# Patient Record
Sex: MALE | Race: WHITE | Employment: PART TIME | URBAN - METROPOLITAN AREA
[De-identification: names, ages, dates, MRNs, and addresses within clinical notes are randomized per-mention and may not be internally consistent; named-entity substitution may affect disease eponyms.]

---

## 2019-07-25 ENCOUNTER — HOSPITAL ENCOUNTER (OUTPATIENT)
Dept: NON INVASIVE DIAGNOSTICS | Age: 69
Discharge: HOME | End: 2019-07-25
Payer: MEDICARE

## 2019-07-25 LAB
LV EF: 55 %
LVEF MODALITY: NORMAL

## 2019-07-25 PROCEDURE — 93306 TTE W/DOPPLER COMPLETE: CPT

## 2019-07-30 ENCOUNTER — HOSPITAL ENCOUNTER (OUTPATIENT)
Age: 69
Discharge: HOME | End: 2019-07-30
Payer: MEDICARE

## 2019-07-30 ENCOUNTER — HOSPITAL ENCOUNTER (OUTPATIENT)
Dept: GENERAL RADIOLOGY | Age: 69
Discharge: HOME | End: 2019-07-30
Payer: MEDICARE

## 2019-07-30 DIAGNOSIS — R06.02 BREATH SHORTNESS: ICD-10-CM

## 2019-07-30 PROCEDURE — 71046 X-RAY EXAM CHEST 2 VIEWS: CPT

## 2020-01-08 ENCOUNTER — HOSPITAL ENCOUNTER (OUTPATIENT)
Dept: NUCLEAR MEDICINE | Age: 70
Discharge: HOME | End: 2020-01-08
Payer: MEDICARE

## 2020-01-08 ENCOUNTER — HOSPITAL ENCOUNTER (OUTPATIENT)
Dept: GENERAL RADIOLOGY | Age: 70
Discharge: HOME | End: 2020-01-08
Payer: MEDICARE

## 2020-01-08 ENCOUNTER — HOSPITAL ENCOUNTER (OUTPATIENT)
Dept: NON INVASIVE DIAGNOSTICS | Age: 70
Discharge: HOME | End: 2020-01-08
Payer: MEDICARE

## 2020-01-08 LAB
LV EF: 60 %
LVEF MODALITY: NORMAL

## 2020-01-08 PROCEDURE — A9502 TC99M TETROFOSMIN: HCPCS | Performed by: FAMILY MEDICINE

## 2020-01-08 PROCEDURE — 93017 CV STRESS TEST TRACING ONLY: CPT

## 2020-01-08 PROCEDURE — 3430000000 HC RX DIAGNOSTIC RADIOPHARMACEUTICAL: Performed by: FAMILY MEDICINE

## 2020-01-08 PROCEDURE — 71046 X-RAY EXAM CHEST 2 VIEWS: CPT

## 2020-01-08 PROCEDURE — 78452 HT MUSCLE IMAGE SPECT MULT: CPT

## 2020-01-08 RX ADMIN — TETROFOSMIN 31.9 MILLICURIE: 1.38 INJECTION, POWDER, LYOPHILIZED, FOR SOLUTION INTRAVENOUS at 12:00

## 2020-01-08 RX ADMIN — TETROFOSMIN 11 MILLICURIE: 1.38 INJECTION, POWDER, LYOPHILIZED, FOR SOLUTION INTRAVENOUS at 10:20

## 2020-08-07 ENCOUNTER — HOSPITAL ENCOUNTER (EMERGENCY)
Age: 70
Discharge: HOME | End: 2020-08-07
Payer: MEDICARE

## 2020-08-07 ENCOUNTER — APPOINTMENT (OUTPATIENT)
Dept: CT IMAGING | Age: 70
End: 2020-08-07
Payer: MEDICARE

## 2020-08-07 VITALS
BODY MASS INDEX: 36.16 KG/M2 | OXYGEN SATURATION: 92 % | SYSTOLIC BLOOD PRESSURE: 110 MMHG | HEART RATE: 69 BPM | WEIGHT: 225 LBS | DIASTOLIC BLOOD PRESSURE: 61 MMHG | RESPIRATION RATE: 18 BRPM | HEIGHT: 66 IN | TEMPERATURE: 97.6 F

## 2020-08-07 LAB
A/G RATIO: 1.8 (ref 1.1–2.2)
ALBUMIN SERPL-MCNC: 4.6 G/DL (ref 3.4–5)
ALP BLD-CCNC: 88 U/L (ref 40–129)
ALT SERPL-CCNC: 37 U/L (ref 10–40)
ANION GAP SERPL CALCULATED.3IONS-SCNC: 10 MMOL/L (ref 3–16)
AST SERPL-CCNC: 26 U/L (ref 15–37)
BACTERIA: ABNORMAL /HPF
BASOPHILS ABSOLUTE: 0.1 K/UL (ref 0–0.2)
BASOPHILS RELATIVE PERCENT: 0.9 %
BILIRUB SERPL-MCNC: 0.5 MG/DL (ref 0–1)
BILIRUBIN URINE: ABNORMAL
BLOOD, URINE: ABNORMAL
BUN BLDV-MCNC: 16 MG/DL (ref 7–20)
CALCIUM SERPL-MCNC: 9.5 MG/DL (ref 8.3–10.6)
CHLORIDE BLD-SCNC: 106 MMOL/L (ref 99–110)
CLARITY: ABNORMAL
CO2: 25 MMOL/L (ref 21–32)
COLOR: ABNORMAL
CREAT SERPL-MCNC: 0.9 MG/DL (ref 0.8–1.3)
CRYSTALS, UA: ABNORMAL /HPF
EOSINOPHILS ABSOLUTE: 0.3 K/UL (ref 0–0.6)
EOSINOPHILS RELATIVE PERCENT: 2.5 %
EPITHELIAL CELLS, UA: ABNORMAL /HPF (ref 0–5)
GFR AFRICAN AMERICAN: >60
GFR NON-AFRICAN AMERICAN: >60
GLOBULIN: 2.6 G/DL
GLUCOSE BLD-MCNC: 116 MG/DL (ref 70–99)
GLUCOSE URINE: NEGATIVE MG/DL
HCT VFR BLD CALC: 43.9 % (ref 40.5–52.5)
HEMOGLOBIN: 14.6 G/DL (ref 13.5–17.5)
KETONES, URINE: 15 MG/DL
LEUKOCYTE ESTERASE, URINE: ABNORMAL
LYMPHOCYTES ABSOLUTE: 1.7 K/UL (ref 1–5.1)
LYMPHOCYTES RELATIVE PERCENT: 15.7 %
MCH RBC QN AUTO: 27.7 PG (ref 26–34)
MCHC RBC AUTO-ENTMCNC: 33.2 G/DL (ref 31–36)
MCV RBC AUTO: 83.6 FL (ref 80–100)
MICROSCOPIC EXAMINATION: YES
MONOCYTES ABSOLUTE: 0.6 K/UL (ref 0–1.3)
MONOCYTES RELATIVE PERCENT: 6 %
NEUTROPHILS ABSOLUTE: 8.2 K/UL (ref 1.7–7.7)
NEUTROPHILS RELATIVE PERCENT: 74.9 %
NITRITE, URINE: NEGATIVE
PDW BLD-RTO: 14.4 % (ref 12.4–15.4)
PH UA: 6 (ref 5–8)
PLATELET # BLD: 202 K/UL (ref 135–450)
PMV BLD AUTO: 8.5 FL (ref 5–10.5)
POTASSIUM REFLEX MAGNESIUM: 4.3 MMOL/L (ref 3.5–5.1)
PROTEIN UA: 100 MG/DL
RBC # BLD: 5.25 M/UL (ref 4.2–5.9)
RBC UA: >100 /HPF (ref 0–4)
SODIUM BLD-SCNC: 141 MMOL/L (ref 136–145)
SPECIFIC GRAVITY UA: 1.02 (ref 1–1.03)
TOTAL PROTEIN: 7.2 G/DL (ref 6.4–8.2)
URINE REFLEX TO CULTURE: ABNORMAL
URINE TYPE: ABNORMAL
UROBILINOGEN, URINE: 1 E.U./DL
WBC # BLD: 10.9 K/UL (ref 4–11)
WBC UA: ABNORMAL /HPF (ref 0–5)

## 2020-08-07 PROCEDURE — 2500000003 HC RX 250 WO HCPCS: Performed by: NURSE PRACTITIONER

## 2020-08-07 PROCEDURE — 74176 CT ABD & PELVIS W/O CONTRAST: CPT

## 2020-08-07 PROCEDURE — 81001 URINALYSIS AUTO W/SCOPE: CPT

## 2020-08-07 PROCEDURE — 85025 COMPLETE CBC W/AUTO DIFF WBC: CPT

## 2020-08-07 PROCEDURE — 96361 HYDRATE IV INFUSION ADD-ON: CPT

## 2020-08-07 PROCEDURE — 80053 COMPREHEN METABOLIC PANEL: CPT

## 2020-08-07 PROCEDURE — 6360000002 HC RX W HCPCS: Performed by: NURSE PRACTITIONER

## 2020-08-07 PROCEDURE — 96375 TX/PRO/DX INJ NEW DRUG ADDON: CPT

## 2020-08-07 PROCEDURE — 99284 EMERGENCY DEPT VISIT MOD MDM: CPT

## 2020-08-07 PROCEDURE — 2580000003 HC RX 258: Performed by: NURSE PRACTITIONER

## 2020-08-07 PROCEDURE — 96374 THER/PROPH/DIAG INJ IV PUSH: CPT

## 2020-08-07 RX ORDER — KETOROLAC TROMETHAMINE 30 MG/ML
15 INJECTION, SOLUTION INTRAMUSCULAR; INTRAVENOUS ONCE
Status: COMPLETED | OUTPATIENT
Start: 2020-08-07 | End: 2020-08-07

## 2020-08-07 RX ORDER — ONDANSETRON 2 MG/ML
4 INJECTION INTRAMUSCULAR; INTRAVENOUS ONCE
Status: COMPLETED | OUTPATIENT
Start: 2020-08-07 | End: 2020-08-07

## 2020-08-07 RX ORDER — 0.9 % SODIUM CHLORIDE 0.9 %
500 INTRAVENOUS SOLUTION INTRAVENOUS ONCE
Status: COMPLETED | OUTPATIENT
Start: 2020-08-07 | End: 2020-08-07

## 2020-08-07 RX ORDER — ONDANSETRON 4 MG/1
4 TABLET, FILM COATED ORAL EVERY 8 HOURS PRN
Qty: 20 TABLET | Refills: 0 | Status: ON HOLD | OUTPATIENT
Start: 2020-08-07 | End: 2021-07-25

## 2020-08-07 RX ORDER — TAMSULOSIN HYDROCHLORIDE 0.4 MG/1
0.4 CAPSULE ORAL DAILY
Qty: 5 CAPSULE | Refills: 0 | Status: SHIPPED | OUTPATIENT
Start: 2020-08-07 | End: 2020-08-13 | Stop reason: SDUPTHER

## 2020-08-07 RX ORDER — HYDROCODONE BITARTRATE AND ACETAMINOPHEN 5; 325 MG/1; MG/1
1 TABLET ORAL EVERY 6 HOURS PRN
Qty: 10 TABLET | Refills: 0 | Status: SHIPPED | OUTPATIENT
Start: 2020-08-07 | End: 2020-08-10

## 2020-08-07 RX ADMIN — HYDROMORPHONE HYDROCHLORIDE 0.5 MG: 1 INJECTION, SOLUTION INTRAMUSCULAR; INTRAVENOUS; SUBCUTANEOUS at 14:58

## 2020-08-07 RX ADMIN — SODIUM CHLORIDE 500 ML: 9 INJECTION, SOLUTION INTRAVENOUS at 14:56

## 2020-08-07 RX ADMIN — KETOROLAC TROMETHAMINE 15 MG: 30 INJECTION, SOLUTION INTRAMUSCULAR at 14:58

## 2020-08-07 RX ADMIN — ONDANSETRON HYDROCHLORIDE 4 MG: 2 INJECTION, SOLUTION INTRAMUSCULAR; INTRAVENOUS at 14:57

## 2020-08-07 ASSESSMENT — ENCOUNTER SYMPTOMS
ABDOMINAL PAIN: 0
SORE THROAT: 0
RHINORRHEA: 0
COLOR CHANGE: 0
SHORTNESS OF BREATH: 0

## 2020-08-07 ASSESSMENT — PAIN SCALES - GENERAL
PAINLEVEL_OUTOF10: 6
PAINLEVEL_OUTOF10: 7

## 2020-08-07 ASSESSMENT — PAIN DESCRIPTION - LOCATION: LOCATION: FLANK

## 2020-08-07 ASSESSMENT — PAIN DESCRIPTION - ORIENTATION: ORIENTATION: LEFT

## 2020-08-07 NOTE — ED PROVIDER NOTES
Evaluated by [id number] [facility] Provider          [facility] ED  EMERGENCY DEPARTMENT ENCOUNTER        Pt Name: Gracie Aguilar  MRN: [id number]  [place] 1950  Dateof evaluation: 8/7/2020  Provider: MAGGIE Oconnor - CNP  PCP: Towanda Gosselin, MD  ED Attending: No att. providers found    [address] [facility]       Chief Complaint   Patient presents with    Flank Pain     patient states he has a h/o kidney stones, left sided flank pain       HISTORY OF PRESENTILLNESS   (Location/Symptom, Timing/Onset, Context/Setting, Quality, Duration, Modifying Factors, Severity)  Note limiting factors. Gracie Aguilar is a 71 y.o. male for left flank pain. Onset was this am.  Context includes pt states he started with left sided flank pain this am. He states he has had kidney stones in the past and follows with uroloty. Alleviating factors include nothing. Aggravating factors include nothing. Pain is */10. Unsure of the name but took pain medications early this am.    Nursing Notes were all reviewed and agreed with or any disagreements were addressed  in the HPI. REVIEW OF SYSTEMS    (2-9 systems for level 4, 10 or more for level 5)     Review of Systems   Constitutional: Negative for fever. HENT: Negative for congestion, rhinorrhea and sore throat. Respiratory: Negative for shortness of breath. Cardiovascular: Negative for chest pain. Gastrointestinal: Negative for abdominal pain. Genitourinary: Positive for flank pain. Negative for decreased urine volume and difficulty urinating. Musculoskeletal: Negative for arthralgias and myalgias. Skin: Negative for color change and rash. Neurological: Negative for dizziness and light-headedness. Psychiatric/Behavioral: Negative for agitation. All other systems reviewed and are negative. Positives and Pertinent negatives as per HPI. Except as noted above in the ROS, all other systems were reviewed and negative. PAST MEDICAL HISTORY     Past Medical History:   Diagnosis Date    Hyperlipidemia     Hypertension          SURGICAL HISTORY       Past Surgical History:   Procedure Laterality Date    TONSILLECTOMY           CURRENT MEDICATIONS       Discharge Medication List as of 8/7/2020  4:57 PM      CONTINUE these medications which have NOT CHANGED    Details   metoprolol (LOPRESSOR) 50 MG tablet Take 50 mg by mouth 2 times daily. Historical Med      atorvastatin (LIPITOR) 40 MG tablet Take 40 mg by mouth daily. Historical Med      amLODIPine (NORVASC) 10 MG tablet Take 10 mg by mouth daily. Historical Med      aspirin 81 MG tablet Take 81 mg by mouth daily. Historical Med      Loratadine 10 MG CAPS Take  by mouth. Historical Med               ALLERGIES     Gluten meal    FAMILY HISTORY       Family History   Problem Relation Age of Onset    Heart Disease Mother     Stroke Mother           SOCIAL HISTORY       Social History     Socioeconomic History    Marital status:      Spouse name: None    Number of children: None    Years of education: None    Highest education level: None   Occupational History    None   Social Needs    Financial resource strain: None    Food insecurity     Worry: None     Inability: None    Transportation needs     Medical: None     Non-medical: None   Tobacco Use    Smoking status: Former Smoker    Tobacco comment: quit 1977, pack a day   Substance and Sexual Activity    Alcohol use: No    Drug use: None    Sexual activity: None   Lifestyle    Physical activity     Days per week: None     Minutes per session: None    Stress: None   Relationships    Social connections     Talks on phone: None     Gets together: None     Attends Taoism service: None     Active member of club or organization: None     Attends meetings of clubs or organizations: None     Relationship status: None    Intimate partner violence     Fear of current or ex partner: None     Emotionally abused: None     Physically abused: None     Forced sexual activity: None   Other Topics Concern    None   Social History Narrative    None       SCREENINGS    [place] Coma Scale  Eye Opening: Spontaneous  Best Verbal Response: Oriented  Best Motor Response: Obeys commands  [place] Coma Scale Score: 15        PHYSICAL EXAM  (up to 7 for level 4, 8 or more for level 5)     ED Triage Vitals [08/07/20 1409]   BP Temp Temp Source Pulse Resp SpO2 Height Weight   (!) 142/72 97.7 °F (36.5 °C) Oral 72 18 96 % 5' 6\" (1.676 m) 225 lb (102.1 kg)       Physical Exam  Constitutional:       Appearance: He is well-developed. He is obese. HENT:      Head: Normocephalic and atraumatic. Neck:      Musculoskeletal: Normal range of motion. Cardiovascular:      Rate and Rhythm: Normal rate. Pulmonary:      Effort: Pulmonary effort is normal. No respiratory distress. Abdominal:      General: There is no distension. Palpations: Abdomen is soft. Tenderness: There is no abdominal tenderness. There is left CVA tenderness. There is no right CVA tenderness. Musculoskeletal: Normal range of motion. Skin:     General: Skin is warm and dry. Neurological:      Mental Status: He is alert and oriented to person, place, and time.          DIAGNOSTIC RESULTS   LABS:    Labs Reviewed   CBC WITH AUTO DIFFERENTIAL - Abnormal; Notable for the following components:       Result Value    Neutrophils Absolute 8.2 (*)     All other components within normal limits    Narrative:     Performed at:  [place] [address],  [employer] [facility]   Phone [phone]   COMPREHENSIVE METABOLIC PANEL W/ REFLEX TO MG FOR LOW K - Abnormal; Notable for the following components:    Glucose 116 (*)     All other components within normal limits    Narrative:     Performed at:  [place] [address],  [employer] [facility]   Phone [phone]   URINE RT REFLEX TO CULTURE - Abnormal; Notable for the following components:    Clarity, UA CLOUDY (*)     Bilirubin Urine SMALL (*)     Ketones, Urine 15 (*)     Blood, Urine LARGE (*)     Protein,  (*)     Leukocyte Esterase, Urine TRACE (*)     All other components within normal limits    Narrative:     Performed at:  [address] [facility] [address],  [place], [facility]   Phone [phone]   MICROSCOPIC URINALYSIS - Abnormal; Notable for the following components:    RBC, UA >100 (*)     Bacteria, UA 1+ (*)     Crystals, UA 1+ Ca. Oxalate (*)     All other components within normal limits    Narrative:     Performed at:  [facility],  [place], [facility]   Phone [phone]       All other labs werewithin normal range or not returned as of this dictation. EKG: All EKG's are interpreted by the Emergency Department Physician who either signs or Co-signs this chart in the absence of a cardiologist.  Please see their note for interpretation of EKG. RADIOLOGY:   Abdominal CT without IV contrast interpreted by radiologist for  Impression:     1. 4 mm calculus within the proximal left ureter, at the left UPJ, with   associated mild left hydronephrosis. 2. Bilateral nonobstructing nephrolithiasis              Interpretation per the Radiologist below, if available at the time of this note:    CT ABDOMEN PELVIS WO CONTRAST   Preliminary Result   1. 4 mm calculus within the proximal left ureter, at the left UPJ, with   associated mild left hydronephrosis. 2. Bilateral nonobstructing nephrolithiasis. No results found.       PROCEDURES   Unless otherwise noted below, none     Procedures     CRITICAL CARE TIME   N/A    CONSULTS:  None      EMERGENCYDEPARTMENT COURSE and DIFFERENTIAL DIAGNOSIS/MDM:   Vitals:    Vitals:    08/07/20 1619 08/07/20 1633 08/07/20 1648 08/07/20 1706   BP: 115/71 119/69 110/61 110/61   Pulse: 59 60 68 69   Resp: 16 14 15 18   Temp:    97.6 °F (36.4 °C)   TempSrc:    Oral   SpO2: 93% 93% 92%    Weight:       Height:           Patient was given the following medications:  Medications   0.9 % sodium chloride bolus (0 mLs Intravenous Stopped 8/7/20 1549)   ketorolac (TORADOL) injection 15 mg (15 mg Intravenous Given 8/7/20 1458)   HYDROmorphone (DILAUDID) injection 0.5 mg (0.5 mg Intravenous Given 8/7/20 1458)   ondansetron (ZOFRAN) injection 4 mg (4 mg Intravenous Given 8/7/20 1457)       Patient was seen and evaluated by myself. Patient here for left-sided flank pain that started earlier today. Patient states he has not had a fever but he has had nausea and vomiting. Patient does have a history of kidney stones. On exam the patient is awake and alert hemodynamically stable non toxic in appearance. Lab values have been reviewed and interpreted. Urinalysis was not concerning for infection but there was hematuria. Abdominal CT was concerning for a 4 mm stone at the left UPJ. Patient was provided with IV fluids nausea and pain medications. On reevaluation he states his pain is improved. Patient was offered admission for pain control but states he would prefer to go home. Patient will be discharged home      Patient was encouraged to follow up with their primary care doctor, or the referring one if they did not have one listed, in the next 1-2 days for a reevaluation. He states he has a follow-up appointment with urology next month however I encouraged him to follow-up this week to to see if they wanted to evaluate him sooner. They were encouraged to return to the ER for any worsening symptoms. Patient was ultimately discharged with all questions answered. Patient was discharged home with Flomax Zofran and Norco.  Prescriptions. The patient tolerated their visit well. I have evaluated this patient. My supervising physician was available for consultation.  The patient and / or the family were informed of the results of any tests, a time was given to answer questions, a plan was proposed and they agreed with plan. FINAL IMPRESSION      1. Kidney stone    2. Non-intractable vomiting with nausea, unspecified vomiting type          DISPOSITION/PLAN   DISPOSITION Decision To Discharge 08/07/2020 04:46:12 PM      PATIENT REFERRED TO:  Jose Hightower MD  [address] [facility] [address] [phone]    Schedule an appointment as soon as possible for a visit in 2 days  for re-evaluation    [facility] ED  [address] [facility]  [phone]    If symptoms worsen    Angelena Buerger, MD  [address] [facility] [facility] [address] [place] [address]  [phone]    Schedule an appointment as soon as possible for a visit in 1 week  If symptoms worsen      DISCHARGE MEDICATIONS:  Discharge Medication List as of 8/7/2020  4:57 PM      START taking these medications    Details   tamsulosin (FLOMAX) 0.4 MG capsule Take 1 capsule by mouth daily for 5 doses, Disp-5 capsule,R-0Print      ondansetron (ZOFRAN) 4 MG tablet Take 1 tablet by mouth every 8 hours as needed for Nausea, Disp-20 tablet,R-0Print      HYDROcodone-acetaminophen (NORCO) 5-325 MG per tablet Take 1 tablet by mouth every 6 hours as needed for Pain for up to 3 days. , Disp-10 tablet,R-0Print             DISCONTINUED MEDICATIONS:  Discharge Medication List as of 8/7/2020  4:57 PM                 (Please note that portions of this note were completed with a voice recognition program.  Efforts were made to edit the dictations but occasionally words are mis-transcribed.)    MAGGIE Aragon CNP (electronically signed)         MAGGIE Aragon CNP  08/07/20 1096

## 2020-08-13 ENCOUNTER — HOSPITAL ENCOUNTER (EMERGENCY)
Age: 70
Discharge: HOME | End: 2020-08-13
Attending: EMERGENCY MEDICINE
Payer: MEDICARE

## 2020-08-13 ENCOUNTER — APPOINTMENT (OUTPATIENT)
Dept: GENERAL RADIOLOGY | Age: 70
End: 2020-08-13
Payer: MEDICARE

## 2020-08-13 ENCOUNTER — APPOINTMENT (OUTPATIENT)
Dept: CT IMAGING | Age: 70
End: 2020-08-13
Payer: MEDICARE

## 2020-08-13 VITALS
WEIGHT: 225 LBS | SYSTOLIC BLOOD PRESSURE: 134 MMHG | HEIGHT: 66 IN | RESPIRATION RATE: 16 BRPM | DIASTOLIC BLOOD PRESSURE: 74 MMHG | BODY MASS INDEX: 36.16 KG/M2 | HEART RATE: 71 BPM | TEMPERATURE: 97.7 F | OXYGEN SATURATION: 96 %

## 2020-08-13 LAB
A/G RATIO: 1.6 (ref 1.1–2.2)
ALBUMIN SERPL-MCNC: 4.5 G/DL (ref 3.4–5)
ALP BLD-CCNC: 86 U/L (ref 40–129)
ALT SERPL-CCNC: 33 U/L (ref 10–40)
ANION GAP SERPL CALCULATED.3IONS-SCNC: 13 MMOL/L (ref 3–16)
AST SERPL-CCNC: 29 U/L (ref 15–37)
BASOPHILS ABSOLUTE: 0.1 K/UL (ref 0–0.2)
BASOPHILS RELATIVE PERCENT: 0.6 %
BILIRUB SERPL-MCNC: 0.6 MG/DL (ref 0–1)
BILIRUBIN URINE: NEGATIVE
BLOOD, URINE: NEGATIVE
BUN BLDV-MCNC: 21 MG/DL (ref 7–20)
CALCIUM SERPL-MCNC: 9.5 MG/DL (ref 8.3–10.6)
CHLORIDE BLD-SCNC: 103 MMOL/L (ref 99–110)
CLARITY: CLEAR
CO2: 24 MMOL/L (ref 21–32)
COLOR: YELLOW
CREAT SERPL-MCNC: 1 MG/DL (ref 0.8–1.3)
EOSINOPHILS ABSOLUTE: 0.2 K/UL (ref 0–0.6)
EOSINOPHILS RELATIVE PERCENT: 1.3 %
GFR AFRICAN AMERICAN: >60
GFR NON-AFRICAN AMERICAN: >60
GLOBULIN: 2.8 G/DL
GLUCOSE BLD-MCNC: 183 MG/DL (ref 70–99)
GLUCOSE URINE: NEGATIVE MG/DL
HCT VFR BLD CALC: 44.5 % (ref 40.5–52.5)
HEMOGLOBIN: 14.5 G/DL (ref 13.5–17.5)
KETONES, URINE: NEGATIVE MG/DL
LEUKOCYTE ESTERASE, URINE: NEGATIVE
LYMPHOCYTES ABSOLUTE: 2.4 K/UL (ref 1–5.1)
LYMPHOCYTES RELATIVE PERCENT: 14.2 %
MCH RBC QN AUTO: 27.3 PG (ref 26–34)
MCHC RBC AUTO-ENTMCNC: 32.6 G/DL (ref 31–36)
MCV RBC AUTO: 83.7 FL (ref 80–100)
MICROSCOPIC EXAMINATION: NORMAL
MONOCYTES ABSOLUTE: 0.9 K/UL (ref 0–1.3)
MONOCYTES RELATIVE PERCENT: 5.5 %
NEUTROPHILS ABSOLUTE: 13 K/UL (ref 1.7–7.7)
NEUTROPHILS RELATIVE PERCENT: 78.4 %
NITRITE, URINE: NEGATIVE
PDW BLD-RTO: 14 % (ref 12.4–15.4)
PH UA: 5.5 (ref 5–8)
PLATELET # BLD: 230 K/UL (ref 135–450)
PMV BLD AUTO: 9.2 FL (ref 5–10.5)
POTASSIUM SERPL-SCNC: 3.9 MMOL/L (ref 3.5–5.1)
PROTEIN UA: NEGATIVE MG/DL
RBC # BLD: 5.32 M/UL (ref 4.2–5.9)
SODIUM BLD-SCNC: 140 MMOL/L (ref 136–145)
SPECIFIC GRAVITY UA: >=1.03 (ref 1–1.03)
TOTAL PROTEIN: 7.3 G/DL (ref 6.4–8.2)
URINE REFLEX TO CULTURE: NORMAL
URINE TYPE: NORMAL
UROBILINOGEN, URINE: 0.2 E.U./DL
WBC # BLD: 16.6 K/UL (ref 4–11)

## 2020-08-13 PROCEDURE — 6360000002 HC RX W HCPCS: Performed by: EMERGENCY MEDICINE

## 2020-08-13 PROCEDURE — 96366 THER/PROPH/DIAG IV INF ADDON: CPT

## 2020-08-13 PROCEDURE — 2580000003 HC RX 258: Performed by: EMERGENCY MEDICINE

## 2020-08-13 PROCEDURE — 96375 TX/PRO/DX INJ NEW DRUG ADDON: CPT

## 2020-08-13 PROCEDURE — 2500000003 HC RX 250 WO HCPCS: Performed by: EMERGENCY MEDICINE

## 2020-08-13 PROCEDURE — 74176 CT ABD & PELVIS W/O CONTRAST: CPT

## 2020-08-13 PROCEDURE — 81003 URINALYSIS AUTO W/O SCOPE: CPT

## 2020-08-13 PROCEDURE — 96365 THER/PROPH/DIAG IV INF INIT: CPT

## 2020-08-13 PROCEDURE — 85025 COMPLETE CBC W/AUTO DIFF WBC: CPT

## 2020-08-13 PROCEDURE — 74018 RADEX ABDOMEN 1 VIEW: CPT

## 2020-08-13 PROCEDURE — 99284 EMERGENCY DEPT VISIT MOD MDM: CPT

## 2020-08-13 PROCEDURE — 80053 COMPREHEN METABOLIC PANEL: CPT

## 2020-08-13 RX ORDER — ONDANSETRON 2 MG/ML
4 INJECTION INTRAMUSCULAR; INTRAVENOUS
Status: DISCONTINUED | OUTPATIENT
Start: 2020-08-13 | End: 2020-08-13 | Stop reason: HOSPADM

## 2020-08-13 RX ORDER — OXYCODONE HYDROCHLORIDE 5 MG/1
5 TABLET ORAL EVERY 6 HOURS PRN
Qty: 12 TABLET | Refills: 0 | Status: SHIPPED | OUTPATIENT
Start: 2020-08-13 | End: 2020-08-16

## 2020-08-13 RX ORDER — TAMSULOSIN HYDROCHLORIDE 0.4 MG/1
0.4 CAPSULE ORAL DAILY
Qty: 5 CAPSULE | Refills: 0 | Status: SHIPPED | OUTPATIENT
Start: 2020-08-13 | End: 2021-02-01

## 2020-08-13 RX ORDER — KETOROLAC TROMETHAMINE 30 MG/ML
15 INJECTION, SOLUTION INTRAMUSCULAR; INTRAVENOUS ONCE
Status: COMPLETED | OUTPATIENT
Start: 2020-08-13 | End: 2020-08-13

## 2020-08-13 RX ORDER — 0.9 % SODIUM CHLORIDE 0.9 %
1000 INTRAVENOUS SOLUTION INTRAVENOUS ONCE
Status: COMPLETED | OUTPATIENT
Start: 2020-08-13 | End: 2020-08-13

## 2020-08-13 RX ADMIN — SODIUM CHLORIDE 1000 ML: 9 INJECTION, SOLUTION INTRAVENOUS at 06:23

## 2020-08-13 RX ADMIN — HYDROMORPHONE HYDROCHLORIDE 0.5 MG: 1 INJECTION, SOLUTION INTRAMUSCULAR; INTRAVENOUS; SUBCUTANEOUS at 06:56

## 2020-08-13 RX ADMIN — ONDANSETRON HYDROCHLORIDE 4 MG: 2 INJECTION, SOLUTION INTRAMUSCULAR; INTRAVENOUS at 06:23

## 2020-08-13 RX ADMIN — KETOROLAC TROMETHAMINE 15 MG: 30 INJECTION, SOLUTION INTRAMUSCULAR at 06:24

## 2020-08-13 RX ADMIN — HYDROMORPHONE HYDROCHLORIDE 0.5 MG: 1 INJECTION, SOLUTION INTRAMUSCULAR; INTRAVENOUS; SUBCUTANEOUS at 08:29

## 2020-08-13 ASSESSMENT — PAIN DESCRIPTION - PAIN TYPE: TYPE: ACUTE PAIN

## 2020-08-13 ASSESSMENT — PAIN SCALES - GENERAL
PAINLEVEL_OUTOF10: 8
PAINLEVEL_OUTOF10: 8
PAINLEVEL_OUTOF10: 9
PAINLEVEL_OUTOF10: 5

## 2020-08-13 ASSESSMENT — PAIN DESCRIPTION - LOCATION: LOCATION: FLANK

## 2020-08-13 ASSESSMENT — PAIN DESCRIPTION - ORIENTATION: ORIENTATION: LEFT

## 2020-08-13 NOTE — ED PROVIDER NOTES
9:38 AM: I discussed the history, physical examination, laboratory and imaging studies, and treatment plan with Dr. Jing Almanzar. Kaushik Pagan was signed out to me in stable condition. Please see Dr. Latrice Watson documentation for details of their history, physical, and laboratory studies. Upon re-examination, a summary of Eula Ortiz's history, physical examination, and studies are as follows:      ED Course as of Aug 13 0938   Thu Aug 13, 2020   0801 CT Abd/Pelvis: IMPRESSION:  1. Findings consistent with slight distal migration of 4-5 mm left ureteral  calculus with persistent left-sided obstructive uropathy. Degree of  hydronephrosis appears minimally increased when compared to the study of  08/07/2020 as described above. 2. Tiny bilateral nonobstructing intrarenal calculi.    [ER]   2066 On reassessment, patient reports the pain is currently 7-8/10. He says the pain has been slowly creeping up. We discussed the results of his CT scan. He feels that he is willing to attempt going home with further pain control if we feel that it is appropriate.    [ER]   0850 Urinalysis without evidence of infection or blood. [ER]   0192 Page Dr. Edison Reynoso to discuss the patient    [ER]   0475 Received call from urology Dr Jeny Flowers. They felt that discharge was appropriate at this time. They stated that if patient called the office, he should be able to be seen within the next couple days. [ER]   [id number] Reassessment, patient reports proved pain. At this time, feel that he is appropriate for discharge with follow-up with urology. Return precautions given. Patient given further prescription for pain control. Patient discharged in stable condition.    [ER]      ED Course User Index  [ER] Dodie Velasquez MD     Patient to be discharged to follow-up with urology. Patient prescribed oxycodone for breakthrough pain. Patient prescribed tamsulosin. Patient encouraged to drink plenty of fluids.   Patient to call urology in order set up appointment within the next few days. Patient is agreeable with this plan. Strict return precautions given. Patient discharged in stable condition.      Geoff Cason MD  08/13/20 5012

## 2020-08-13 NOTE — ED PROVIDER NOTES
[address] ED      CHIEF COMPLAINT  Flank Pain (Left sided flank pain. Patient recently diagnosed with a kidney stone. States pain is worse tonight. )       HISTORY OF PRESENT ILLNESS  Hannah Li is a 71 y.o. male  who presents to the ED complaining of left-sided flank pain. Sharp in nature. He was seen on August 7 and diagnosed with a 4 mm proximal ureteral stone. He was sent home with supportive medications and advised to follow-up with urology. He has been previously established with Dr. Jayce Wu for history of BPH. He states that he has been unable to contact him yet to schedule follow-up appointment. Pain seemed to be more in the left flank and then radiated more into the left upper quadrant and now is more migrating lower into the left lower quadrant. The pain acutely worsened this morning. Is associate with some nausea dry heaving but no vomiting. He states that he did have some constipation from the oxycodone that was prescribed but that seemed to resolve today. No dysuria, hematuria or difficulty urinating. No fevers. No other abdominal pain. No other complaints, modifying factors or associated symptoms. I have reviewed the following from the nursing documentation.     Past Medical History:   Diagnosis Date    Hyperlipidemia     Hypertension      Past Surgical History:   Procedure Laterality Date    TONSILLECTOMY       Family History   Problem Relation Age of Onset    Heart Disease Mother     Stroke Mother      Social History     Socioeconomic History    Marital status:      Spouse name: Not on file    Number of children: Not on file    Years of education: Not on file    Highest education level: Not on file   Occupational History    Not on file   Social Needs    Financial resource strain: Not on file    Food insecurity     Worry: Not on file     Inability: Not on file    Transportation needs     Medical: Not on file     Non-medical: Not on file Comprehensive metabolic panel   Result Value Ref Range    Sodium 140 136 - 145 mmol/L    Potassium 3.9 3.5 - 5.1 mmol/L    Chloride 103 99 - 110 mmol/L    CO2 24 21 - 32 mmol/L    Anion Gap 13 3 - 16    Glucose 183 (H) 70 - 99 mg/dL    BUN 21 (H) 7 - 20 mg/dL    CREATININE 1.0 0.8 - 1.3 mg/dL    GFR Non-African American >60 >60    GFR African American >60 >60    Calcium 9.5 8.3 - 10.6 mg/dL    Total Protein 7.3 6.4 - 8.2 g/dL    Alb 4.5 3.4 - 5.0 g/dL    Albumin/Globulin Ratio 1.6 1.1 - 2.2    Total Bilirubin 0.6 0.0 - 1.0 mg/dL    Alkaline Phosphatase 86 40 - 129 U/L    ALT 33 10 - 40 U/L    AST 29 15 - 37 U/L    Globulin 2.8 g/dL   Urine, reflex to culture    Specimen: Urine, clean catch   Result Value Ref Range    Color, UA Yellow Straw/Yellow    Clarity, UA Clear Clear    Glucose, Ur Negative Negative mg/dL    Bilirubin Urine Negative Negative    Ketones, Urine Negative Negative mg/dL    Specific Gravity, UA >=1.030 1.005 - 1.030    Blood, Urine Negative Negative    pH, UA 5.5 5.0 - 8.0    Protein, UA Negative Negative mg/dL    Urobilinogen, Urine 0.2 <2.0 E.U./dL    Nitrite, Urine Negative Negative    Leukocyte Esterase, Urine Negative Negative    Microscopic Examination Not Indicated     Urine Type NotGiven     Urine Reflex to Culture Not Indicated        RADIOLOGY  CT a/p pending at time of signout. Xr Abdomen (kub) (single Ap View)    Result Date: 8/13/2020  EXAMINATION: ONE SUPINE XRAY VIEW(S) OF THE ABDOMEN 8/13/2020 6:15 am COMPARISON: 08/07/2020 CT HISTORY: ORDERING SYSTEM PROVIDED HISTORY: left stone on 8/7, now pain migrating lower, worsening TECHNOLOGIST PROVIDED HISTORY: Reason for exam:->left stone on 8/7, now pain migrating lower, worsening Reason for Exam: left kidney stone pain worse Acuity: Acute Type of Exam: Initial FINDINGS: The 4 mm calculus in the proximal left ureter described on prior CT can not be seen on  images from that study.   No suspicious calcification along the course of the left ureter on the current plain film. This may be too small to detect. Interval passage can not be evaluated. Additional punctate calcifications previously observed in both kidneys are not demonstrated as well. There are scattered vascular phleboliths in the pelvis. The bowel gas pattern is unremarkable. There are no significant skeletal findings. No radiographic evidence of nephrolithiasis. ED COURSE/MDM  Patient seen and evaluated. Old records reviewed. Labs and imaging reviewed and results discussed with patient. Patient presenting for evaluation of worsening pain with recent diagnosis of ureteral stone. He has a leukocytosis which is new compared to previous labs. Certainly concern for possible infected stone versus other etiology such as a developing other process such as diverticulitis. For that reason I did add a CT scan of his abdomen and pelvis after reviewing that, especially as his KUB did not demonstrate the previously noted stone. Certainly this may be just that it was not visualized on x-ray versus that has passed and now a new process is ongoing. He was given Zofran and Toradol as well as fluids with improvement of symptoms. 07:00 AM: I discussed the history, physical, and treatment plan with Dr. Jose Dee. Rodrigo Liu was signed out in stable condition. Please see Dr. Kumarivere Cargo note for further details, including diagnosis and disposition. During the patient's ED course, the patient was given:  Medications   0.9 % sodium chloride bolus (has no administration in time range)   ondansetron (ZOFRAN) injection 4 mg (has no administration in time range)   ketorolac (TORADOL) injection 15 mg (has no administration in time range)        DISCLAIMER: This chart was created using Dragon dictation software.   Efforts were made by me to ensure accuracy, however some errors may be present due to limitations of this technology and occasionally words are not transcribed correctly.         Sharla Noble MD  08/14/20 1537

## 2020-08-14 ENCOUNTER — HOSPITAL ENCOUNTER (OUTPATIENT)
Age: 70
Discharge: HOME | End: 2020-08-14
Payer: MEDICARE

## 2020-08-14 PROCEDURE — U0003 INFECTIOUS AGENT DETECTION BY NUCLEIC ACID (DNA OR RNA); SEVERE ACUTE RESPIRATORY SYNDROME CORONAVIRUS 2 (SARS-COV-2) (CORONAVIRUS DISEASE [COVID-19]), AMPLIFIED PROBE TECHNIQUE, MAKING USE OF HIGH THROUGHPUT TECHNOLOGIES AS DESCRIBED BY CMS-2020-01-R: HCPCS

## 2020-08-14 NOTE — PROGRESS NOTES
1.  Do not eat or drink anything after 12 midnight prior to surgery. This includes no water, chewing gum or mints. 2.  Take the following pills with a small sip of water on the morning of surgery . 3. Aspirin, Ibuprofen, Advil, Naproxen, Vitamin E and other Anti-inflammatory products should be stopped for 5 days before surgery or as directed by your physician. 4.  Check with your doctor regarding stopping Plavix, Coumadin, Lovenox, Fragmin or other blood thinners. 5.  Do not smoke and do not drink alcoholic beverages 24 hours prior to surgery. This includes NA Beer. 6.  You may brush your teeth and gargle the morning of surgery. DO NOT SWALLOW WATER.  7.  You MUST make arrangements for a responsible adult to take you home after your surgery. You will not be allowed to leave alone or drive yourself home. It is strongly suggested someone stay with you the first 24 hours. Your surgery will be cancelled if you do not have a ride home. 8.  A parent/legal guardian must accompany a child scheduled for surgery and plan to stay at the hospital until the child is discharged. Please do not bring other children with you. 9.  Please wear simple, loose fitting clothing to the hospital.  Jakub Carrero not bring valuables ( money, credit cards, checkbooks, etc.)  Do not wear any makeup (including no eye makeup) or nail polish on your fingers or toes. 10.  Do not wear any jewelry or piercing on the day of surgery. All body piercing jewelry must be removed. 11.  If you have dentures, they will be removed before going to the OR; we will provide you a container. If you wear contact lenses or glasses, they will be removed; please bring a case for them. 12.  Please see your family doctor/pediatrician for a history & physical and/or concerning medications. Bring any test results/reports from your physician's office the day of surgery. 15.  Remember to bring Blood Bank Bracelet to the hospital on the day of surgery.   14.  If

## 2020-08-15 LAB — SARS-COV-2, NAA: NOT DETECTED

## 2020-08-16 ENCOUNTER — ANESTHESIA EVENT (OUTPATIENT)
Dept: OPERATING ROOM | Age: 70
End: 2020-08-16
Payer: MEDICARE

## 2020-08-17 ENCOUNTER — ANESTHESIA (OUTPATIENT)
Dept: OPERATING ROOM | Age: 70
End: 2020-08-17
Payer: MEDICARE

## 2020-08-17 ENCOUNTER — HOSPITAL ENCOUNTER (OUTPATIENT)
Age: 70
Setting detail: OUTPATIENT SURGERY
Discharge: HOME | End: 2020-08-17
Attending: UROLOGY | Admitting: UROLOGY
Payer: MEDICARE

## 2020-08-17 ENCOUNTER — APPOINTMENT (OUTPATIENT)
Dept: GENERAL RADIOLOGY | Age: 70
End: 2020-08-17
Attending: UROLOGY
Payer: MEDICARE

## 2020-08-17 VITALS
OXYGEN SATURATION: 96 % | SYSTOLIC BLOOD PRESSURE: 145 MMHG | TEMPERATURE: 97.8 F | RESPIRATION RATE: 18 BRPM | HEART RATE: 79 BPM | DIASTOLIC BLOOD PRESSURE: 84 MMHG | WEIGHT: 220 LBS | HEIGHT: 66 IN | BODY MASS INDEX: 35.36 KG/M2

## 2020-08-17 VITALS
SYSTOLIC BLOOD PRESSURE: 87 MMHG | RESPIRATION RATE: 11 BRPM | DIASTOLIC BLOOD PRESSURE: 57 MMHG | OXYGEN SATURATION: 97 %

## 2020-08-17 PROCEDURE — 3700000000 HC ANESTHESIA ATTENDED CARE: Performed by: UROLOGY

## 2020-08-17 PROCEDURE — 2709999900 HC NON-CHARGEABLE SUPPLY: Performed by: UROLOGY

## 2020-08-17 PROCEDURE — 2720000010 HC SURG SUPPLY STERILE: Performed by: UROLOGY

## 2020-08-17 PROCEDURE — 6370000000 HC RX 637 (ALT 250 FOR IP): Performed by: UROLOGY

## 2020-08-17 PROCEDURE — 2580000003 HC RX 258: Performed by: UROLOGY

## 2020-08-17 PROCEDURE — 3600000014 HC SURGERY LEVEL 4 ADDTL 15MIN: Performed by: UROLOGY

## 2020-08-17 PROCEDURE — 6360000002 HC RX W HCPCS: Performed by: NURSE ANESTHETIST, CERTIFIED REGISTERED

## 2020-08-17 PROCEDURE — 76000 FLUOROSCOPY <1 HR PHYS/QHP: CPT

## 2020-08-17 PROCEDURE — 2500000003 HC RX 250 WO HCPCS: Performed by: ANESTHESIOLOGY

## 2020-08-17 PROCEDURE — 2500000003 HC RX 250 WO HCPCS: Performed by: NURSE ANESTHETIST, CERTIFIED REGISTERED

## 2020-08-17 PROCEDURE — 3700000001 HC ADD 15 MINUTES (ANESTHESIA): Performed by: UROLOGY

## 2020-08-17 PROCEDURE — 7100000001 HC PACU RECOVERY - ADDTL 15 MIN: Performed by: UROLOGY

## 2020-08-17 PROCEDURE — 6360000002 HC RX W HCPCS: Performed by: UROLOGY

## 2020-08-17 PROCEDURE — 7100000010 HC PHASE II RECOVERY - FIRST 15 MIN: Performed by: UROLOGY

## 2020-08-17 PROCEDURE — 7100000000 HC PACU RECOVERY - FIRST 15 MIN: Performed by: UROLOGY

## 2020-08-17 PROCEDURE — 2580000003 HC RX 258: Performed by: ANESTHESIOLOGY

## 2020-08-17 PROCEDURE — 7100000011 HC PHASE II RECOVERY - ADDTL 15 MIN: Performed by: UROLOGY

## 2020-08-17 PROCEDURE — C2617 STENT, NON-COR, TEM W/O DEL: HCPCS | Performed by: UROLOGY

## 2020-08-17 PROCEDURE — 88300 SURGICAL PATH GROSS: CPT

## 2020-08-17 PROCEDURE — 82365 CALCULUS SPECTROSCOPY: CPT

## 2020-08-17 PROCEDURE — C1769 GUIDE WIRE: HCPCS | Performed by: UROLOGY

## 2020-08-17 PROCEDURE — 3600000004 HC SURGERY LEVEL 4 BASE: Performed by: UROLOGY

## 2020-08-17 DEVICE — URETERAL STENT
Type: IMPLANTABLE DEVICE | Site: URETER | Status: FUNCTIONAL
Brand: CONTOUR™

## 2020-08-17 RX ORDER — MORPHINE SULFATE 10 MG/ML
2 INJECTION, SOLUTION INTRAMUSCULAR; INTRAVENOUS EVERY 5 MIN PRN
Status: DISCONTINUED | OUTPATIENT
Start: 2020-08-17 | End: 2020-08-17 | Stop reason: HOSPADM

## 2020-08-17 RX ORDER — ONDANSETRON 2 MG/ML
4 INJECTION INTRAMUSCULAR; INTRAVENOUS
Status: DISCONTINUED | OUTPATIENT
Start: 2020-08-17 | End: 2020-08-17 | Stop reason: HOSPADM

## 2020-08-17 RX ORDER — LIDOCAINE HYDROCHLORIDE 20 MG/ML
JELLY TOPICAL PRN
Status: DISCONTINUED | OUTPATIENT
Start: 2020-08-17 | End: 2020-08-17 | Stop reason: ALTCHOICE

## 2020-08-17 RX ORDER — MORPHINE SULFATE 10 MG/ML
1 INJECTION, SOLUTION INTRAMUSCULAR; INTRAVENOUS EVERY 5 MIN PRN
Status: DISCONTINUED | OUTPATIENT
Start: 2020-08-17 | End: 2020-08-17 | Stop reason: HOSPADM

## 2020-08-17 RX ORDER — LIDOCAINE HYDROCHLORIDE 20 MG/ML
INJECTION, SOLUTION INFILTRATION; PERINEURAL PRN
Status: DISCONTINUED | OUTPATIENT
Start: 2020-08-17 | End: 2020-08-17 | Stop reason: SDUPTHER

## 2020-08-17 RX ORDER — SODIUM CHLORIDE 0.9 % (FLUSH) 0.9 %
10 SYRINGE (ML) INJECTION PRN
Status: DISCONTINUED | OUTPATIENT
Start: 2020-08-17 | End: 2020-08-17 | Stop reason: HOSPADM

## 2020-08-17 RX ORDER — ONDANSETRON 2 MG/ML
INJECTION INTRAMUSCULAR; INTRAVENOUS PRN
Status: DISCONTINUED | OUTPATIENT
Start: 2020-08-17 | End: 2020-08-17 | Stop reason: SDUPTHER

## 2020-08-17 RX ORDER — FENTANYL CITRATE 50 UG/ML
INJECTION, SOLUTION INTRAMUSCULAR; INTRAVENOUS PRN
Status: DISCONTINUED | OUTPATIENT
Start: 2020-08-17 | End: 2020-08-17 | Stop reason: SDUPTHER

## 2020-08-17 RX ORDER — OXYCODONE HYDROCHLORIDE AND ACETAMINOPHEN 5; 325 MG/1; MG/1
1 TABLET ORAL PRN
Status: DISCONTINUED | OUTPATIENT
Start: 2020-08-17 | End: 2020-08-17 | Stop reason: HOSPADM

## 2020-08-17 RX ORDER — MEPERIDINE HYDROCHLORIDE 25 MG/ML
12.5 INJECTION INTRAMUSCULAR; INTRAVENOUS; SUBCUTANEOUS EVERY 5 MIN PRN
Status: DISCONTINUED | OUTPATIENT
Start: 2020-08-17 | End: 2020-08-17 | Stop reason: HOSPADM

## 2020-08-17 RX ORDER — GLYCOPYRROLATE 0.2 MG/ML
INJECTION INTRAMUSCULAR; INTRAVENOUS PRN
Status: DISCONTINUED | OUTPATIENT
Start: 2020-08-17 | End: 2020-08-17 | Stop reason: SDUPTHER

## 2020-08-17 RX ORDER — SULFAMETHOXAZOLE AND TRIMETHOPRIM 400; 80 MG/1; MG/1
1 TABLET ORAL 2 TIMES DAILY
Qty: 8 TABLET | Refills: 0 | Status: SHIPPED | OUTPATIENT
Start: 2020-08-17 | End: 2020-08-21

## 2020-08-17 RX ORDER — CEFAZOLIN SODIUM 2 G/50ML
SOLUTION INTRAVENOUS PRN
Status: DISCONTINUED | OUTPATIENT
Start: 2020-08-17 | End: 2020-08-17 | Stop reason: SDUPTHER

## 2020-08-17 RX ORDER — PROPOFOL 10 MG/ML
INJECTION, EMULSION INTRAVENOUS PRN
Status: DISCONTINUED | OUTPATIENT
Start: 2020-08-17 | End: 2020-08-17 | Stop reason: SDUPTHER

## 2020-08-17 RX ORDER — OXYCODONE HYDROCHLORIDE AND ACETAMINOPHEN 5; 325 MG/1; MG/1
2 TABLET ORAL PRN
Status: DISCONTINUED | OUTPATIENT
Start: 2020-08-17 | End: 2020-08-17 | Stop reason: HOSPADM

## 2020-08-17 RX ORDER — PHENAZOPYRIDINE HYDROCHLORIDE 200 MG/1
200 TABLET, FILM COATED ORAL 3 TIMES DAILY PRN
Qty: 15 TABLET | Refills: 0 | Status: SHIPPED | OUTPATIENT
Start: 2020-08-17 | End: 2020-08-22

## 2020-08-17 RX ORDER — OXYCODONE HYDROCHLORIDE AND ACETAMINOPHEN 5; 325 MG/1; MG/1
0.5 TABLET ORAL EVERY 4 HOURS PRN
Qty: 10 TABLET | Refills: 0 | Status: SHIPPED | OUTPATIENT
Start: 2020-08-17 | End: 2020-08-22

## 2020-08-17 RX ORDER — SODIUM CHLORIDE, SODIUM LACTATE, POTASSIUM CHLORIDE, CALCIUM CHLORIDE 600; 310; 30; 20 MG/100ML; MG/100ML; MG/100ML; MG/100ML
INJECTION, SOLUTION INTRAVENOUS CONTINUOUS
Status: DISCONTINUED | OUTPATIENT
Start: 2020-08-17 | End: 2020-08-17 | Stop reason: HOSPADM

## 2020-08-17 RX ORDER — MAGNESIUM HYDROXIDE 1200 MG/15ML
LIQUID ORAL PRN
Status: DISCONTINUED | OUTPATIENT
Start: 2020-08-17 | End: 2020-08-17 | Stop reason: ALTCHOICE

## 2020-08-17 RX ORDER — SODIUM CHLORIDE 0.9 % (FLUSH) 0.9 %
10 SYRINGE (ML) INJECTION EVERY 12 HOURS SCHEDULED
Status: DISCONTINUED | OUTPATIENT
Start: 2020-08-17 | End: 2020-08-17 | Stop reason: HOSPADM

## 2020-08-17 RX ORDER — DEXAMETHASONE SODIUM PHOSPHATE 4 MG/ML
INJECTION, SOLUTION INTRA-ARTICULAR; INTRALESIONAL; INTRAMUSCULAR; INTRAVENOUS; SOFT TISSUE PRN
Status: DISCONTINUED | OUTPATIENT
Start: 2020-08-17 | End: 2020-08-17 | Stop reason: SDUPTHER

## 2020-08-17 RX ADMIN — GLYCOPYRROLATE 0.2 MG: 0.2 INJECTION, SOLUTION INTRAMUSCULAR; INTRAVENOUS at 14:14

## 2020-08-17 RX ADMIN — CEFAZOLIN SODIUM 2 G: 2 SOLUTION INTRAVENOUS at 13:40

## 2020-08-17 RX ADMIN — SODIUM CHLORIDE, POTASSIUM CHLORIDE, SODIUM LACTATE AND CALCIUM CHLORIDE 1000 ML: 600; 310; 30; 20 INJECTION, SOLUTION INTRAVENOUS at 14:30

## 2020-08-17 RX ADMIN — Medication 2 G: at 13:40

## 2020-08-17 RX ADMIN — SODIUM CHLORIDE, POTASSIUM CHLORIDE, SODIUM LACTATE AND CALCIUM CHLORIDE: 600; 310; 30; 20 INJECTION, SOLUTION INTRAVENOUS at 12:42

## 2020-08-17 RX ADMIN — FENTANYL CITRATE 50 MCG: 50 INJECTION INTRAMUSCULAR; INTRAVENOUS at 13:50

## 2020-08-17 RX ADMIN — FAMOTIDINE 20 MG: 10 INJECTION, SOLUTION INTRAVENOUS at 12:58

## 2020-08-17 RX ADMIN — DEXAMETHASONE SODIUM PHOSPHATE 8 MG: 4 INJECTION, SOLUTION INTRAMUSCULAR; INTRAVENOUS at 13:55

## 2020-08-17 RX ADMIN — FENTANYL CITRATE 50 MCG: 50 INJECTION INTRAMUSCULAR; INTRAVENOUS at 14:08

## 2020-08-17 RX ADMIN — ONDANSETRON 4 MG: 2 INJECTION, SOLUTION INTRAMUSCULAR; INTRAVENOUS at 13:55

## 2020-08-17 RX ADMIN — PROPOFOL 200 MG: 10 INJECTION, EMULSION INTRAVENOUS at 13:50

## 2020-08-17 RX ADMIN — LIDOCAINE HYDROCHLORIDE 100 MG: 20 INJECTION, SOLUTION INFILTRATION; PERINEURAL at 13:50

## 2020-08-17 RX ADMIN — GLYCOPYRROLATE 0.2 MG: 0.2 INJECTION, SOLUTION INTRAMUSCULAR; INTRAVENOUS at 14:11

## 2020-08-17 ASSESSMENT — PULMONARY FUNCTION TESTS
PIF_VALUE: 4
PIF_VALUE: 4
PIF_VALUE: 3
PIF_VALUE: 0
PIF_VALUE: 2
PIF_VALUE: 11
PIF_VALUE: 5
PIF_VALUE: 1
PIF_VALUE: 4
PIF_VALUE: 4
PIF_VALUE: 22
PIF_VALUE: 3
PIF_VALUE: 4
PIF_VALUE: 11
PIF_VALUE: 4
PIF_VALUE: 11
PIF_VALUE: 12
PIF_VALUE: 4
PIF_VALUE: 14
PIF_VALUE: 3
PIF_VALUE: 12
PIF_VALUE: 1
PIF_VALUE: 3
PIF_VALUE: 1
PIF_VALUE: 2
PIF_VALUE: 1
PIF_VALUE: 2
PIF_VALUE: 0
PIF_VALUE: 4
PIF_VALUE: 13
PIF_VALUE: 11
PIF_VALUE: 4
PIF_VALUE: 3
PIF_VALUE: 15
PIF_VALUE: 5

## 2020-08-17 ASSESSMENT — LIFESTYLE VARIABLES: SMOKING_STATUS: 0

## 2020-08-17 ASSESSMENT — ENCOUNTER SYMPTOMS: SHORTNESS OF BREATH: 0

## 2020-08-17 ASSESSMENT — PAIN - FUNCTIONAL ASSESSMENT: PAIN_FUNCTIONAL_ASSESSMENT: 0-10

## 2020-08-17 NOTE — PROGRESS NOTES
NO CHANGE IN PHYSICAL ASSESSMENT; PT AND FAMILY VERBALIZE UNDERSTANDING OF INSTRUCTIONS; PT DISCHARGED VIA W/C BY NURSE WITH FAMILY IN STABLE CONDITION; PT DENIES ANY PAIN

## 2020-08-17 NOTE — ANESTHESIA POSTPROCEDURE EVALUATION
Department of Anesthesiology  Postprocedure Note    Patient: Leslie Oates  MRN: [id number]  YOB: 1950  Date of evaluation: 8/17/2020  Time:  3:05 PM     Procedure Summary     Date:  08/17/20 Room / Location:  [address] [place] [address] / [facility]    Anesthesia Start:  3909 Anesthesia Stop:  4560    Procedure:  CYSTOSCOPY, LEFT URETEROSCOPY WITH HOLMIUM LASER LITHOTRIPSY,  STONE EXTRACTION, LEFT STENT PLACEMENT (Left Bladder) Diagnosis:  (CALCULUS OF URETER)    Surgeon:  Claire Ndiaye MD Responsible Provider:  Dorcas Vallejo MD    Anesthesia Type:  general ASA Status:  2          Anesthesia Type: general    Kimberly Phase I: Kimberly Score: 2    Kimberly Phase II:      Last vitals: Reviewed and per EMR flowsheets.    Vitals:    08/17/20 1425 08/17/20 1430 08/17/20 1435 08/17/20 1440   BP: (!) 89/56 (!) 90/57 (!) 103/57 110/67   Pulse: 81 84 81 90   Resp: 10 10 10 13   Temp: 97.6 °F (36.4 °C)      TempSrc: Temporal      SpO2: (!) 88% 92% 94% 93%   Weight:       Height:           Anesthesia Post Evaluation    Patient location during evaluation: bedside  Patient participation: complete - patient participated  Level of consciousness: awake and alert  Nausea & Vomiting: no nausea  Complications: no  Cardiovascular status: hemodynamically stable  Respiratory status: acceptable  Hydration status: euvolemic

## 2020-08-17 NOTE — ANESTHESIA PRE PROCEDURE
Department of Anesthesiology  Preprocedure Note       Name:  Talha Stinson   Age:  71 y.o.  :  1950                                          MRN:  [id number]         Date:  2020      Surgeon: Guera Emanuel):  Gene Valerio MD    Procedure: Procedure(s):  CYSTOSCOPY, LEFT URETEROSCOPY WITH POSSIBLE HOLMIUM LASER LITHOTRIPSY, POSSIBLE LEFT STENT PLACEMENT    Medications prior to admission:   Prior to Admission medications    Medication Sig Start Date End Date Taking? Authorizing Provider   tamsulosin (FLOMAX) 0.4 MG capsule Take 1 capsule by mouth daily for 5 doses 20 Yes Tamy Alcala MD   ondansetron [facility]) 4 MG tablet Take 1 tablet by mouth every 8 hours as needed for Nausea 20  Yes MAGGIE Sung CNP   atorvastatin (LIPITOR) 40 MG tablet Take 40 mg by mouth daily. Yes Historical Provider, MD   Loratadine 10 MG CAPS Take  by mouth. Yes Historical Provider, MD   metoprolol (LOPRESSOR) 50 MG tablet Take 50 mg by mouth 2 times daily. Historical Provider, MD   amLODIPine (NORVASC) 10 MG tablet Take 10 mg by mouth daily. Historical Provider, MD   aspirin 81 MG tablet Take 81 mg by mouth daily. Historical Provider, MD       Current medications:    Current Facility-Administered Medications   Medication Dose Route Frequency Provider Last Rate Last Dose    ceFAZolin (ANCEF) 2 g in sterile water 20 mL IV syringe  2 g Intravenous Once Gene Valerio MD        lactated ringers infusion   Intravenous Continuous Guilherme Sloan MD        sodium chloride flush 0.9 % injection 10 mL  10 mL Intravenous 2 times per day Guilherme Sloan MD        sodium chloride flush 0.9 % injection 10 mL  10 mL Intravenous PRN Guilherme Sloan MD        famotidine (PEPCID) injection 20 mg  20 mg Intravenous Once Guilherme Sloan MD           Allergies:     Allergies   Allergen Reactions    Gluten Meal        Problem List:    Patient Active Problem List   Diagnosis Code    Chest pain R07.9    HTN (hypertension) I10    HLD (hyperlipidemia) E78.5       Past Medical History:        Diagnosis Date    Hyperlipidemia     Hypertension        Past Surgical History:        Procedure Laterality Date    TONSILLECTOMY         Social History:    Social History     Tobacco Use    Smoking status: Former Smoker    Smokeless tobacco: Never Used    Tobacco comment: quit 1977, pack a day   Substance Use Topics    Alcohol use: No                                Counseling given: Not Answered  Comment: quit 1977, pack a day      Vital Signs (Current): There were no vitals filed for this visit. BP Readings from Last 3 Encounters:   08/13/20 134/74   08/07/20 110/61   03/02/15 138/74       NPO Status:  MN+, SEE MAR FOR AM MEDS                                                                               BMI:   Wt Readings from Last 3 Encounters:   08/13/20 225 lb (102.1 kg)   08/07/20 225 lb (102.1 kg)   03/02/15 232 lb (105.2 kg)     There is no height or weight on file to calculate BMI.    CBC:   Lab Results   Component Value Date    WBC 16.6 08/13/2020    RBC 5.32 08/13/2020    HGB 14.5 08/13/2020    HCT 44.5 08/13/2020    MCV 83.7 08/13/2020    RDW 14.0 08/13/2020     08/13/2020       CMP:   Lab Results   Component Value Date     08/13/2020    K 3.9 08/13/2020    K 4.3 08/07/2020     08/13/2020    CO2 24 08/13/2020    BUN 21 08/13/2020    CREATININE 1.0 08/13/2020    GFRAA >60 08/13/2020    AGRATIO 1.6 08/13/2020    LABGLOM >60 08/13/2020    GLUCOSE 183 08/13/2020    PROT 7.3 08/13/2020    CALCIUM 9.5 08/13/2020    BILITOT 0.6 08/13/2020    ALKPHOS 86 08/13/2020    AST 29 08/13/2020    ALT 33 08/13/2020       POC Tests: No results for input(s): POCGLU, POCNA, POCK, POCCL, POCBUN, POCHEMO, POCHCT in the last 72 hours. Coags: No results found for: PROTIME, INR, APTT    HCG (If Applicable):  No results found for: PREGTESTUR, PREGSERUM, HCG, HCGQUANT     ABGs: No results found for: PHART, PO2ART, MOC5MJM, YRN9GKK, BEART, A8YNPLZP     Type & Screen (If Applicable):  No results found for: LABABO, LABRH    Drug/Infectious Status (If Applicable):  No results found for: HIV, HEPCAB    COVID-19 Screening (If Applicable):   Lab Results   Component Value Date    COVID19 NOT DETECTED 08/14/2020         Anesthesia Evaluation  Patient summary reviewed no history of anesthetic complications:   Airway: Mallampati: II  TM distance: >3 FB   Neck ROM: full  Mouth opening: > = 3 FB Dental:    (+) upper dentures      Pulmonary:Negative Pulmonary ROS breath sounds clear to auscultation      (-) COPD, asthma, shortness of breath, sleep apnea and not a current smoker          Patient did not smoke on day of surgery. Cardiovascular:    (+) hypertension:,     (-) pacemaker, past MI, CAD, CABG/stent, dysrhythmias,  angina and  CHF    ECG reviewed  Rhythm: regular  Rate: normal    Stress test reviewed       Beta Blocker:  Not on Beta Blocker         Neuro/Psych:   Negative Neuro/Psych ROS     (-) seizures, TIA and CVA           GI/Hepatic/Renal:   (+) renal disease: kidney stones,      (-) GERD, liver disease and no morbid obesity       Endo/Other:    (+) : arthritis:., no malignancy/cancer. (-) diabetes mellitus, hypothyroidism, hyperthyroidism, blood dyscrasia, no malignancy/cancer               Abdominal:       Abdomen: soft. Vascular: negative vascular ROS. Anesthesia Plan      general     ASA 2       Induction: intravenous. MIPS: Postoperative opioids intended and Prophylactic antiemetics administered. Anesthetic plan and risks discussed with patient. Plan discussed with CRNA.           This pre-anesthesia assessment may be used as a history and physical.    DOS STAFF ADDENDUM:    Pt seen and examined, chart reviewed (including anesthesia, drug and allergy history). No interval changes to history and physical examination. Anesthetic plan, risks, benefits, alternatives, and personnel involved discussed with patient. Patient verbalized an understanding and agrees to proceed.       Tate Soto MD  August 17, 2020  12:16 PM          Tate Soto MD   8/17/2020

## 2020-08-17 NOTE — H&P
Dr. Maykel Mercer Same Day Surgery H&P     8/17/2020  Shawn Martin    Reason for Surgery:  Left flank pain  Requesting Physician:  ER/Fam       History Obtained From:  patient, electronic medical record    HISTORY OF PRESENT ILLNESS:                The patient is a 71 y.o. male who presents with left flank pain and a 5 mm stone. I am taking the patient to surgery for evaluation and care of this problem. Past Medical History:        Diagnosis Date    Hyperlipidemia     Hypertension      Past Surgical History:        Procedure Laterality Date    TONSILLECTOMY       Current Medications:   Prior to Admission medications    Medication Sig Start Date End Date Taking? Authorizing Provider   tamsulosin (FLOMAX) 0.4 MG capsule Take 1 capsule by mouth daily for 5 doses 8/13/20 8/18/20 Yes Ronny Root MD   ondansetron [facility]) 4 MG tablet Take 1 tablet by mouth every 8 hours as needed for Nausea 8/7/20  Yes MAGGIE Zheng CNP   metoprolol (LOPRESSOR) 50 MG tablet Take 50 mg by mouth 2 times daily. Yes Historical Provider, MD   atorvastatin (LIPITOR) 40 MG tablet Take 40 mg by mouth daily. Yes Historical Provider, MD   amLODIPine (NORVASC) 10 MG tablet Take 10 mg by mouth daily. Yes Historical Provider, MD   Loratadine 10 MG CAPS Take  by mouth. Yes Historical Provider, MD   aspirin 81 MG tablet Take 81 mg by mouth daily. Historical Provider, MD     Allergies: Allergies   Allergen Reactions    Gluten Meal      Social History:  Reviewed, non contributory    Family History:  Reviewed, non contributory      REVIEW OF SYSTEMS:    12 point ROS was already completed and this has been reviewed. The pertinent positive findings include left flank pain.     PHYSICAL EXAM:    VITALS:  BP (!) 155/78   Pulse 65   Temp 97.8 °F (36.6 °C) (Temporal)   Resp 20   Ht 5' 6\" (1.676 m)   Wt 220 lb (99.8 kg)   SpO2 95%   BMI 35.51 kg/m²   H&N: Sclera normal, no masses, trachea midline, no bruit  CVS: Normal rate and rhythm, no murmurs or rubs, peripheral pulses equal, no clubbing or cyanosis. RESP: Breath sounds equal bilateral, few rhonchi. ABDO: Soft, non-tender, bowel sounds active, no organomegaly, no hernias. LYMPH:  No lymphadenopathy. Skin: Warm dry and intact. : No CVAT, normal external genitalia, no discharge. MSK: Grossly normal for patient  LIZZIE: Grossly normal for patient  PSY: No acute changes noted in psychosocial assessment. DATA:    CBC:   Lab Results   Component Value Date    WBC 16.6 08/13/2020    RBC 5.32 08/13/2020    HGB 14.5 08/13/2020    HCT 44.5 08/13/2020    MCV 83.7 08/13/2020    MCH 27.3 08/13/2020    MCHC 32.6 08/13/2020    RDW 14.0 08/13/2020     08/13/2020    MPV 9.2 08/13/2020     BMP:    Lab Results   Component Value Date     08/13/2020    K 3.9 08/13/2020    K 4.3 08/07/2020     08/13/2020    CO2 24 08/13/2020    BUN 21 08/13/2020    LABALBU 4.5 08/13/2020    CREATININE 1.0 08/13/2020    CALCIUM 9.5 08/13/2020    GFRAA >60 08/13/2020    LABGLOM >60 08/13/2020    GLUCOSE 183 08/13/2020     U/A:    Lab Results   Component Value Date    COLORU Yellow 08/13/2020    PROTEINU Negative 08/13/2020    PHUR 5.5 08/13/2020    WBCUA 3-5 08/07/2020    RBCUA >100 08/07/2020    BACTERIA 1+ 08/07/2020    CLARITYU Clear 08/13/2020    SPECGRAV >=1.030 08/13/2020    LEUKOCYTESUR Negative 08/13/2020    UROBILINOGEN 0.2 08/13/2020    BILIRUBINUR Negative 08/13/2020    BLOODU Negative 08/13/2020    GLUCOSEU Negative 08/13/2020       IMPRESSION/RECOMMENDATIONS:   Patient will be taken to surgery for definitive care for the presenting problem(s). The patient has been informed of the goals, risks and benefits of the procedure. Informed consent has been obtained and all of the patient's questions were answered to their satisfaction. The patient wishes to proceed with the planned surgery.       Mirella Chu M.D.

## 2020-08-20 LAB
CALCULI COMPOSITION: NORMAL
MASS: 17 MG
STONE DESCRIPTION: NORMAL
STONE NUMBER: 1
STONE SIZE: NORMAL MM

## 2020-08-30 NOTE — OP NOTE
[address]                 [address] [facility], [address]                                OPERATIVE REPORT    PATIENT NAME: Alee Innocent                   :        1950  MED REC NO:   [id number]                          ROOM:  ACCOUNT NO:   [de-identified]                           ADMIT DATE: 2020  PROVIDER:     Wang Reyes MD    DATE OF PROCEDURE:  2020    PREOPERATIVE DIAGNOSIS:  Left ureteral calculus. POSTOPERATIVE DIAGNOSES:  1. Left ureteral calculus. 2.  BPH. OPERATION PERFORMED:  Cystoscopy with left ureteroscopy, holmium laser  lithotripsy, stone extraction and stent placement. PRIMARY SURGEON:  Wang Reyes MD    ANESTHESIA:  General.    OPERATIVE FINDINGS:  Impacted 5-mm stone, distal ureter at the lower  level of the SI joint. ESTIMATED BLOOD LOSS:  5 mL. HISTORY AND INDICATIONS:  A 70-year-old white male who had presented  with left renal colic and had been diagnosed by CAT scan showing a  distal stone. Options have been discussed, and he elected to proceed  forth with a cystoscopy with left ureteroscopy. Risks, benefits and  expected outcomes have been discussed. PROCEDURE IN DETAIL:  After obtaining informed consent, the patient was  taken to the operative suite. He was given a general anesthetic and  placed on the operative table in a modified dorsal lithotomy position. Prepping and draping were done in sterile fashion. Cystourethroscopy  was then performed. BPH was noted with bilobar hyperplasia, but no  median lobe. Upon entering the bladder, there was diffuse  trabeculation. Both ureteral orifices were orthotopic with minimal  efflux from the left side. There is no evidence for any bladder cancer,  tumors or stones. At this point under fluoroscopy, a Glidewire was then placed beyond the  level of his stone. This was followed by passage of ureteroscope.   The  stone was able to be identified and treated with the holmium laser  breaking up into several pieces. These were subsequently extracted  being sent for pathologic analysis. The ureter itself had significant  swelling. I decided to place a 6 x 22 double-J stent. This was  deployed using the cystoscope and under fluoroscopic guidance, a good  curl was seen within the patient's renal pelvis as well as his bladder. Overall, he tolerated the procedure well. After draining his bladder,  lidocaine jelly was applied. The stones were sent for pathologic  analysis. He was then taken back to the postop recovery room in stable  condition.         Gerard Robledo MD    D: 08/29/2020 18:49:01       T: 08/29/2020 19:00:05     [email or web address]  Job#: [id number]     Doc#: [id number]    CC:

## 2020-09-08 NOTE — PRE-PROCEDURE INSTRUCTIONS
.1.  Do not eat or drink anything after 12 midnight prior to surgery. This includes no water, chewing gum or mints. 2.  Take the following pills with a small sip of water on the morning of surgery 9/14/2020.  3.  Aspirin, Ibuprofen, Advil, Naproxen, Vitamin E and other Anti-inflammatory products should be stopped for 5 days before surgery or as directed by your physician. 4.  Check with your doctor regarding stopping Plavix, Coumadin, Lovenox, Fragmin or other blood thinners. 5.  Do not smoke and do not drink alcoholic beverages 24 hours prior to surgery. This includes NA Beer. 6.  You may brush your teeth and gargle the morning of surgery. DO NOT SWALLOW WATER.  7.  You MUST make arrangements for a responsible adult to take you home after your surgery. You will not be allowed to leave alone or drive yourself home. It is strongly suggested someone stay with you the first 24 hours. Your surgery will be cancelled if you do not have a ride home. 8.  A parent/legal guardian must accompany a child scheduled for surgery and plan to stay at the hospital until the child is discharged. Please do not bring other children with you. 9.  Please wear simple, loose fitting clothing to the hospital.  Edwin Reichee not bring valuables ( money, credit cards, checkbooks, etc.)  Do not wear any makeup (including no eye makeup) or nail polish on your fingers or toes. 10.  Do not wear any jewelry or piercing on the day of surgery. All body piercing jewelry must be removed. 11.  If you have dentures, they will be removed before going to the OR; we will provide you a container. If you wear contact lenses or glasses, they will be removed; please bring a case for them. 12.  Please see your family doctor/pediatrician for a history & physical and/or concerning medications. Bring any test results/reports from your physician's office the day of surgery.     13.  Remember to bring Blood Bank Bracelet to the hospital on the day of

## 2020-09-08 NOTE — PROGRESS NOTES
PAT completed with patient orders placed per MD. Patient going to [place] [id number]. [place] on 9/9/2020 for Covid testing, H&P completed by  patient aware of 1100 arrival [facility] on 9/14/2020 NPO after midnight the night prior to surgery may take AM medications with sip of water, denies any current illness in the home aware to contact MD office if that would change prior to surgery. Sabrina Cardozo

## 2020-09-09 ENCOUNTER — HOSPITAL ENCOUNTER (OUTPATIENT)
Age: 70
Discharge: HOME | End: 2020-09-09
Payer: MEDICARE

## 2020-09-09 PROCEDURE — U0003 INFECTIOUS AGENT DETECTION BY NUCLEIC ACID (DNA OR RNA); SEVERE ACUTE RESPIRATORY SYNDROME CORONAVIRUS 2 (SARS-COV-2) (CORONAVIRUS DISEASE [COVID-19]), AMPLIFIED PROBE TECHNIQUE, MAKING USE OF HIGH THROUGHPUT TECHNOLOGIES AS DESCRIBED BY CMS-2020-01-R: HCPCS

## 2020-09-11 LAB — SARS-COV-2, NAA: NOT DETECTED

## 2020-09-14 ENCOUNTER — ANESTHESIA (OUTPATIENT)
Dept: OPERATING ROOM | Age: 70
End: 2020-09-14
Payer: MEDICARE

## 2020-09-14 ENCOUNTER — HOSPITAL ENCOUNTER (OUTPATIENT)
Age: 70
Setting detail: OUTPATIENT SURGERY
Discharge: HOME | End: 2020-09-14
Attending: UROLOGY | Admitting: UROLOGY
Payer: MEDICARE

## 2020-09-14 ENCOUNTER — ANESTHESIA EVENT (OUTPATIENT)
Dept: OPERATING ROOM | Age: 70
End: 2020-09-14
Payer: MEDICARE

## 2020-09-14 VITALS
SYSTOLIC BLOOD PRESSURE: 123 MMHG | RESPIRATION RATE: 3 BRPM | DIASTOLIC BLOOD PRESSURE: 64 MMHG | OXYGEN SATURATION: 92 %

## 2020-09-14 VITALS
SYSTOLIC BLOOD PRESSURE: 112 MMHG | BODY MASS INDEX: 35.36 KG/M2 | DIASTOLIC BLOOD PRESSURE: 70 MMHG | HEART RATE: 67 BPM | OXYGEN SATURATION: 97 % | TEMPERATURE: 97.5 F | HEIGHT: 66 IN | WEIGHT: 220 LBS | RESPIRATION RATE: 20 BRPM

## 2020-09-14 PROCEDURE — 2580000003 HC RX 258: Performed by: ANESTHESIOLOGY

## 2020-09-14 PROCEDURE — 3700000001 HC ADD 15 MINUTES (ANESTHESIA): Performed by: UROLOGY

## 2020-09-14 PROCEDURE — 6360000002 HC RX W HCPCS: Performed by: UROLOGY

## 2020-09-14 PROCEDURE — 2709999900 HC NON-CHARGEABLE SUPPLY: Performed by: UROLOGY

## 2020-09-14 PROCEDURE — 7100000010 HC PHASE II RECOVERY - FIRST 15 MIN: Performed by: UROLOGY

## 2020-09-14 PROCEDURE — 2580000003 HC RX 258: Performed by: UROLOGY

## 2020-09-14 PROCEDURE — 3600000014 HC SURGERY LEVEL 4 ADDTL 15MIN: Performed by: UROLOGY

## 2020-09-14 PROCEDURE — 3600000004 HC SURGERY LEVEL 4 BASE: Performed by: UROLOGY

## 2020-09-14 PROCEDURE — 7100000011 HC PHASE II RECOVERY - ADDTL 15 MIN: Performed by: UROLOGY

## 2020-09-14 PROCEDURE — 6370000000 HC RX 637 (ALT 250 FOR IP): Performed by: UROLOGY

## 2020-09-14 PROCEDURE — 6360000002 HC RX W HCPCS: Performed by: NURSE ANESTHETIST, CERTIFIED REGISTERED

## 2020-09-14 PROCEDURE — 2500000003 HC RX 250 WO HCPCS: Performed by: NURSE ANESTHETIST, CERTIFIED REGISTERED

## 2020-09-14 PROCEDURE — 3700000000 HC ANESTHESIA ATTENDED CARE: Performed by: UROLOGY

## 2020-09-14 RX ORDER — HYDRALAZINE HYDROCHLORIDE 20 MG/ML
5 INJECTION INTRAMUSCULAR; INTRAVENOUS EVERY 30 MIN PRN
Status: DISCONTINUED | OUTPATIENT
Start: 2020-09-14 | End: 2020-09-14 | Stop reason: HOSPADM

## 2020-09-14 RX ORDER — OXYCODONE HYDROCHLORIDE AND ACETAMINOPHEN 5; 325 MG/1; MG/1
0.5 TABLET ORAL EVERY 4 HOURS PRN
Qty: 10 TABLET | Refills: 0 | Status: SHIPPED | OUTPATIENT
Start: 2020-09-14 | End: 2020-09-19

## 2020-09-14 RX ORDER — DIPHENHYDRAMINE HYDROCHLORIDE 50 MG/ML
6.25 INJECTION INTRAMUSCULAR; INTRAVENOUS
Status: DISCONTINUED | OUTPATIENT
Start: 2020-09-14 | End: 2020-09-14 | Stop reason: HOSPADM

## 2020-09-14 RX ORDER — LIDOCAINE HYDROCHLORIDE 20 MG/ML
INJECTION, SOLUTION INFILTRATION; PERINEURAL PRN
Status: DISCONTINUED | OUTPATIENT
Start: 2020-09-14 | End: 2020-09-14 | Stop reason: SDUPTHER

## 2020-09-14 RX ORDER — LIDOCAINE HYDROCHLORIDE 20 MG/ML
JELLY TOPICAL PRN
Status: DISCONTINUED | OUTPATIENT
Start: 2020-09-14 | End: 2020-09-14 | Stop reason: ALTCHOICE

## 2020-09-14 RX ORDER — LIDOCAINE HYDROCHLORIDE 10 MG/ML
1 INJECTION, SOLUTION EPIDURAL; INFILTRATION; INTRACAUDAL; PERINEURAL
Status: DISCONTINUED | OUTPATIENT
Start: 2020-09-14 | End: 2020-09-14 | Stop reason: HOSPADM

## 2020-09-14 RX ORDER — MAGNESIUM HYDROXIDE 1200 MG/15ML
LIQUID ORAL PRN
Status: DISCONTINUED | OUTPATIENT
Start: 2020-09-14 | End: 2020-09-14 | Stop reason: ALTCHOICE

## 2020-09-14 RX ORDER — SODIUM CHLORIDE, SODIUM LACTATE, POTASSIUM CHLORIDE, CALCIUM CHLORIDE 600; 310; 30; 20 MG/100ML; MG/100ML; MG/100ML; MG/100ML
INJECTION, SOLUTION INTRAVENOUS CONTINUOUS
Status: DISCONTINUED | OUTPATIENT
Start: 2020-09-14 | End: 2020-09-14 | Stop reason: HOSPADM

## 2020-09-14 RX ORDER — PHENAZOPYRIDINE HYDROCHLORIDE 200 MG/1
200 TABLET, FILM COATED ORAL 3 TIMES DAILY PRN
Qty: 15 TABLET | Refills: 0 | Status: SHIPPED | OUTPATIENT
Start: 2020-09-14 | End: 2020-09-19

## 2020-09-14 RX ORDER — SODIUM CHLORIDE 0.9 % (FLUSH) 0.9 %
10 SYRINGE (ML) INJECTION PRN
Status: DISCONTINUED | OUTPATIENT
Start: 2020-09-14 | End: 2020-09-14 | Stop reason: HOSPADM

## 2020-09-14 RX ORDER — ONDANSETRON 2 MG/ML
4 INJECTION INTRAMUSCULAR; INTRAVENOUS EVERY 30 MIN PRN
Status: DISCONTINUED | OUTPATIENT
Start: 2020-09-14 | End: 2020-09-14 | Stop reason: HOSPADM

## 2020-09-14 RX ORDER — OXYCODONE HYDROCHLORIDE AND ACETAMINOPHEN 5; 325 MG/1; MG/1
2 TABLET ORAL PRN
Status: DISCONTINUED | OUTPATIENT
Start: 2020-09-14 | End: 2020-09-14 | Stop reason: HOSPADM

## 2020-09-14 RX ORDER — LABETALOL HYDROCHLORIDE 5 MG/ML
5 INJECTION, SOLUTION INTRAVENOUS
Status: DISCONTINUED | OUTPATIENT
Start: 2020-09-14 | End: 2020-09-14 | Stop reason: HOSPADM

## 2020-09-14 RX ORDER — FENTANYL CITRATE 50 UG/ML
INJECTION, SOLUTION INTRAMUSCULAR; INTRAVENOUS PRN
Status: DISCONTINUED | OUTPATIENT
Start: 2020-09-14 | End: 2020-09-14 | Stop reason: SDUPTHER

## 2020-09-14 RX ORDER — OXYCODONE HYDROCHLORIDE AND ACETAMINOPHEN 5; 325 MG/1; MG/1
1 TABLET ORAL PRN
Status: DISCONTINUED | OUTPATIENT
Start: 2020-09-14 | End: 2020-09-14 | Stop reason: HOSPADM

## 2020-09-14 RX ORDER — MEPERIDINE HYDROCHLORIDE 25 MG/ML
12.5 INJECTION INTRAMUSCULAR; INTRAVENOUS; SUBCUTANEOUS EVERY 5 MIN PRN
Status: DISCONTINUED | OUTPATIENT
Start: 2020-09-14 | End: 2020-09-14 | Stop reason: HOSPADM

## 2020-09-14 RX ORDER — SODIUM CHLORIDE 0.9 % (FLUSH) 0.9 %
10 SYRINGE (ML) INJECTION EVERY 12 HOURS SCHEDULED
Status: DISCONTINUED | OUTPATIENT
Start: 2020-09-14 | End: 2020-09-14 | Stop reason: HOSPADM

## 2020-09-14 RX ORDER — SULFAMETHOXAZOLE AND TRIMETHOPRIM 400; 80 MG/1; MG/1
1 TABLET ORAL 2 TIMES DAILY
Qty: 8 TABLET | Refills: 0 | Status: SHIPPED | OUTPATIENT
Start: 2020-09-14 | End: 2020-09-18

## 2020-09-14 RX ORDER — PROPOFOL 10 MG/ML
INJECTION, EMULSION INTRAVENOUS PRN
Status: DISCONTINUED | OUTPATIENT
Start: 2020-09-14 | End: 2020-09-14 | Stop reason: SDUPTHER

## 2020-09-14 RX ADMIN — LIDOCAINE HYDROCHLORIDE 50 MG: 20 INJECTION, SOLUTION INFILTRATION; PERINEURAL at 13:15

## 2020-09-14 RX ADMIN — SODIUM CHLORIDE, POTASSIUM CHLORIDE, SODIUM LACTATE AND CALCIUM CHLORIDE: 600; 310; 30; 20 INJECTION, SOLUTION INTRAVENOUS at 11:51

## 2020-09-14 RX ADMIN — SODIUM CHLORIDE, POTASSIUM CHLORIDE, SODIUM LACTATE AND CALCIUM CHLORIDE: 600; 310; 30; 20 INJECTION, SOLUTION INTRAVENOUS at 13:12

## 2020-09-14 RX ADMIN — Medication 2 G: at 13:07

## 2020-09-14 RX ADMIN — FENTANYL CITRATE 50 MCG: 50 INJECTION INTRAMUSCULAR; INTRAVENOUS at 13:12

## 2020-09-14 RX ADMIN — PROPOFOL 160 MG: 10 INJECTION, EMULSION INTRAVENOUS at 13:15

## 2020-09-14 ASSESSMENT — PULMONARY FUNCTION TESTS
PIF_VALUE: 0

## 2020-09-14 ASSESSMENT — PAIN SCALES - GENERAL
PAINLEVEL_OUTOF10: 0
PAINLEVEL_OUTOF10: 0

## 2020-09-14 ASSESSMENT — PAIN - FUNCTIONAL ASSESSMENT: PAIN_FUNCTIONAL_ASSESSMENT: 0-10

## 2020-09-14 NOTE — ANESTHESIA POSTPROCEDURE EVALUATION
Department of Anesthesiology  Postprocedure Note    Patient: Leslie Oates  MRN: [id number]  YOB: 1950  Date of evaluation: 9/14/2020  Time:  2:08 PM     Procedure Summary     Date:  09/14/20 Room / Location:  [facility]    Anesthesia Start:  7992 Anesthesia Stop:  1330    Procedure:  CYSTOSCOPY, LEFT STENT REMOVAL (Left Bladder) Diagnosis:  (URETERAL CALCULUS)    Surgeon:  Claire Ndiaye MD Responsible Provider:  Evelin Albrecht MD    Anesthesia Type:  general ASA Status:  2          Anesthesia Type: general    Kimberly Phase I: Kimberly Score: 10    Kimberly Phase II: Kimberly Score: 10    Last vitals: Reviewed and per EMR flowsheets.        Anesthesia Post Evaluation    Comments: Postoperative Anesthesia Note    Name:    Leslie Oates  MRN:      [id number]    Patient Vitals in the past 12 hrs:  09/14/20 1353, BP:112/70, Pulse:67, Resp:20, SpO2:97 %  09/14/20 1327, BP:97/62, Temp:97.5 °F (36.4 °C), Temp src:Temporal, Pulse:75, Resp:12, SpO2:92 %  09/14/20 1127, BP:119/64, Temp:97.2 °F (36.2 °C), Temp src:Temporal, Pulse:57, Resp:20, SpO2:96 %     LABS:    CBC  Lab Results       Component                Value               Date/Time                  WBC                      16.6 (H)            08/13/2020 05:48 AM        HGB                      14.5                08/13/2020 05:48 AM        HCT                      44.5                08/13/2020 05:48 AM        PLT                      230                 08/13/2020 05:48 AM   RENAL  Lab Results       Component                Value               Date/Time                  NA                       140                 08/13/2020 05:48 AM        K                        3.9                 08/13/2020 05:48 AM        K                        4.3                 08/07/2020 02:20 PM        CL                       103                 08/13/2020 05:48 AM        CO2                      24

## 2020-09-14 NOTE — PROGRESS NOTES
Bedside report received from [place] OR RN and Truman Schultz CRNA, pt resting in bed no distress noted, vitals WNL to pre-op baseline. Shad Maki

## 2020-09-14 NOTE — ANESTHESIA PRE PROCEDURE
Department of Anesthesiology  Preprocedure Note       Name:  Roy Dakin   Age:  71 y.o.  :  1950                                          MRN:  [id number]         Date:  2020      Surgeon: Ernestina Garcia):  Lisa Ratliff MD    Procedure: Procedure(s):  CYSTOSCOPY, LEFT STENT REMOVAL    Medications prior to admission:   Prior to Admission medications    Medication Sig Start Date End Date Taking? Authorizing Provider   metoprolol (LOPRESSOR) 50 MG tablet Take 50 mg by mouth 2 times daily. Yes Historical Provider, MD   atorvastatin (LIPITOR) 40 MG tablet Take 40 mg by mouth daily. Yes Historical Provider, MD   amLODIPine (NORVASC) 10 MG tablet Take 10 mg by mouth daily. Yes Historical Provider, MD   Loratadine 10 MG CAPS Take  by mouth. Yes Historical Provider, MD   tamsulosin (FLOMAX) 0.4 MG capsule Take 1 capsule by mouth daily for 5 doses 20  Tomy Hinkle MD   [facility]) 4 MG tablet Take 1 tablet by mouth every 8 hours as needed for Nausea 20   Miguelito Kathleen, APRN - CNP   aspirin 81 MG tablet Take 81 mg by mouth daily.     Historical Provider, MD       Current medications:    Current Facility-Administered Medications   Medication Dose Route Frequency Provider Last Rate Last Dose    ceFAZolin (ANCEF) 2 g in sterile water 20 mL IV syringe  2 g Intravenous Once Lisa Ratliff MD        lactated ringers infusion   Intravenous Continuous Willow Mckoy MD        sodium chloride flush 0.9 % injection 10 mL  10 mL Intravenous 2 times per day Willow Mckoy MD        sodium chloride flush 0.9 % injection 10 mL  10 mL Intravenous PRN Willow Mckoy MD        lidocaine PF 1 % injection 1 mL  1 mL Intradermal Once PRN Willow Mckoy MD        HYDROmorphone (DILAUDID) injection 0.5 mg  0.5 mg Intravenous Q10 Min PRN Yolande Gonzalez MD        HYDROmorphone (DILAUDID) injection 0.5 mg  0.5 mg Intravenous Q5 Min PRN Sharita Sanches Gonzalo Paul MD        oxyCODONE-acetaminophen (PERCOCET) 5-325 MG per tablet 1 tablet  1 tablet Oral PRN Tee Krishnamurthy MD        Or    oxyCODONE-acetaminophen (PERCOCET) 5-325 MG per tablet 2 tablet  2 tablet Oral PRN Tee Krishnamurthy MD        diphenhydrAMINE (BENADRYL) injection 6.25 mg  6.25 mg Intravenous Once PRN Tee Krishnamurthy MD        ondansetron [facility]) injection 4 mg  4 mg Intravenous Q30 Min PRN Tee Krishnamurthy MD        labetalol (NORMODYNE;TRANDATE) injection 5 mg  5 mg Intravenous Q15 Min PRN Tee Krishnamurthy MD        hydrALAZINE (APRESOLINE) injection 5 mg  5 mg Intravenous Q30 Min PRN Tee Krishnamurthy MD        meperidine (DEMEROL) injection 12.5 mg  12.5 mg Intravenous Q5 Min PRN Tee Krishnamurthy MD           Allergies: Allergies   Allergen Reactions    Gluten Meal        Problem List:    Patient Active Problem List   Diagnosis Code    Chest pain R07.9    HTN (hypertension) I10    HLD (hyperlipidemia) E78.5       Past Medical History:        Diagnosis Date    Hyperlipidemia     Hypertension        Past Surgical History:        Procedure Laterality Date    CYSTOSCOPY Left 08/17/2020    LEFT URETEROSCOPY WITH HOLMIUM LASER LITHOTRIPSY,  STONE EXTRACTION, LEFT STENT PLACEMENT (    CYSTOSCOPY Left 8/17/2020    CYSTOSCOPY, LEFT URETEROSCOPY WITH HOLMIUM LASER LITHOTRIPSY,  STONE EXTRACTION, LEFT STENT PLACEMENT performed by Kassidy Gomez MD at [facility] History:    Social History     Tobacco Use    Smoking status: Former Smoker    Smokeless tobacco: Never Used    Tobacco comment: quit 1977, pack a day   Substance Use Topics    Alcohol use:  No                                Counseling given: Not Answered  Comment: quit 1977, pack a day      Vital Signs (Current):   Vitals:    09/08/20 1405 09/14/20 1127   BP:  119/64   Pulse:  57   Resp:  20   Temp:  97.2 °F (36.2 °C)   TempSrc:  Temporal   SpO2:  96% Weight: 220 lb (99.8 kg)    Height: 5' 6\" (1.676 m)                                               BP Readings from Last 3 Encounters:   09/14/20 119/64   08/17/20 (!) 87/57   08/17/20 (!) 145/84       NPO Status: Time of last liquid consumption: 2200                        Time of last solid consumption: 2200                        Date of last liquid consumption: 09/13/20                        Date of last solid food consumption: 09/13/20    BMI:   Wt Readings from Last 3 Encounters:   09/08/20 220 lb (99.8 kg)   08/17/20 220 lb (99.8 kg)   08/13/20 225 lb (102.1 kg)     Body mass index is 35.51 kg/m². CBC:   Lab Results   Component Value Date    WBC 16.6 08/13/2020    RBC 5.32 08/13/2020    HGB 14.5 08/13/2020    HCT 44.5 08/13/2020    MCV 83.7 08/13/2020    RDW 14.0 08/13/2020     08/13/2020       CMP:   Lab Results   Component Value Date     08/13/2020    K 3.9 08/13/2020    K 4.3 08/07/2020     08/13/2020    CO2 24 08/13/2020    BUN 21 08/13/2020    CREATININE 1.0 08/13/2020    GFRAA >60 08/13/2020    AGRATIO 1.6 08/13/2020    LABGLOM >60 08/13/2020    GLUCOSE 183 08/13/2020    PROT 7.3 08/13/2020    CALCIUM 9.5 08/13/2020    BILITOT 0.6 08/13/2020    ALKPHOS 86 08/13/2020    AST 29 08/13/2020    ALT 33 08/13/2020       POC Tests: No results for input(s): POCGLU, POCNA, POCK, POCCL, POCBUN, POCHEMO, POCHCT in the last 72 hours.     Coags: No results found for: PROTIME, INR, APTT    HCG (If Applicable): No results found for: PREGTESTUR, PREGSERUM, HCG, HCGQUANT     ABGs: No results found for: PHART, PO2ART, DWL9RLL, KNT9GJS, BEART, J3ATEWQY     Type & Screen (If Applicable):  No results found for: LABABO, LABRH    Drug/Infectious Status (If Applicable):  No results found for: HIV, HEPCAB    COVID-19 Screening (If Applicable):   Lab Results   Component Value Date    COVID19 NOT DETECTED 09/09/2020         Anesthesia Evaluation  Patient summary reviewed and Nursing notes reviewed no history of anesthetic complications:   Airway: Mallampati: III     Neck ROM: full   Dental:          Pulmonary:                              Cardiovascular:    (+) hypertension:,                   Neuro/Psych:               GI/Hepatic/Renal:            ROS comment: obesity. Endo/Other:                     Abdominal:           Vascular:                                        Anesthesia Plan      general     ASA 2     (Medications & allergies reviewed  All available lab & EKG data reviewed)  Induction: intravenous. Anesthetic plan and risks discussed with patient. Plan discussed with CRNA.                   Elizabeth Umaña MD   9/14/2020

## 2020-09-22 NOTE — OP NOTE
[address]                 [address] [facility] [address]                                OPERATIVE REPORT    PATIENT NAME: Juan Bowie                   :        1950  MED REC NO:   [id number]                          ROOM:  ACCOUNT NO:   [de-identified]                           ADMIT DATE: 2020  PROVIDER:     Russell Hooper MD    DATE OF PROCEDURE:  2020    PREOPERATIVE DIAGNOSIS:  History of hydronephrosis and ureteral stent  placement. POSTOPERATIVE DIAGNOSIS:  History of hydronephrosis and ureteral stent  placement. OPERATION PERFORMED:  Cystourethroscopy with ureteral stent removal.    PRIMARY SURGEON:  Russell Hooper MD    ANESTHESIA:  General.    OPERATIVE FINDINGS:  Indwelling ureteral double-J stent. ESTIMATED BLOOD LOSS:  5 mL. HISTORY AND INDICATIONS:  This is a 66-year-old white male who had  undergone a difficult ureteroscopy with stone extraction and stent  placement. He is now presenting today for cystoscopy and stent removal.  Risks, benefits and expected outcomes of the procedure have been  discussed. DETAILS OF THE PROCEDURE:  After obtaining informed consent, the patient  was taken to the operative suite. He was given a general anesthetic and  placed on the operative table in a modified dorsal lithotomy position  where prepping and draping was done in sterile fashion. Cystourethroscopy was then performed. Bilobar hyperplasia was noted  with BPH. Upon entering the bladder, indwelling ureteral stent was  noted, grasped and removed without any difficulty. A followup  cystoscopy revealed good drainage from the affected ureteral orifice  indicating no further evidence of obstruction. No other abnormalities  were noted. The patient's bladder was drained and lidocaine jelly was  applied. He was taken back to the postop recovery room in stable  condition.         Silverio Yee MD    D: 2020

## 2021-02-01 ENCOUNTER — APPOINTMENT (OUTPATIENT)
Dept: CT IMAGING | Age: 71
End: 2021-02-01
Payer: MEDICARE

## 2021-02-01 ENCOUNTER — HOSPITAL ENCOUNTER (EMERGENCY)
Age: 71
Discharge: HOME | End: 2021-02-01
Attending: STUDENT IN AN ORGANIZED HEALTH CARE EDUCATION/TRAINING PROGRAM
Payer: MEDICARE

## 2021-02-01 VITALS
OXYGEN SATURATION: 96 % | RESPIRATION RATE: 20 BRPM | DIASTOLIC BLOOD PRESSURE: 71 MMHG | SYSTOLIC BLOOD PRESSURE: 147 MMHG | HEART RATE: 74 BPM | BODY MASS INDEX: 36.96 KG/M2 | WEIGHT: 230 LBS | TEMPERATURE: 97.7 F | HEIGHT: 66 IN

## 2021-02-01 DIAGNOSIS — N20.0 KIDNEY STONE: Primary | ICD-10-CM

## 2021-02-01 LAB
A/G RATIO: 1.8 (ref 1.1–2.2)
ALBUMIN SERPL-MCNC: 4.6 G/DL (ref 3.4–5)
ALP BLD-CCNC: 90 U/L (ref 40–129)
ALT SERPL-CCNC: 31 U/L (ref 10–40)
ANION GAP SERPL CALCULATED.3IONS-SCNC: 13 MMOL/L (ref 3–16)
AST SERPL-CCNC: 23 U/L (ref 15–37)
BACTERIA: ABNORMAL /HPF
BASOPHILS ABSOLUTE: 0.1 K/UL (ref 0–0.2)
BASOPHILS RELATIVE PERCENT: 0.8 %
BILIRUB SERPL-MCNC: 0.7 MG/DL (ref 0–1)
BILIRUBIN URINE: ABNORMAL
BLOOD, URINE: ABNORMAL
BUN BLDV-MCNC: 18 MG/DL (ref 7–20)
CALCIUM SERPL-MCNC: 9.5 MG/DL (ref 8.3–10.6)
CHLORIDE BLD-SCNC: 102 MMOL/L (ref 99–110)
CLARITY: ABNORMAL
CO2: 23 MMOL/L (ref 21–32)
COLOR: YELLOW
CREAT SERPL-MCNC: 1 MG/DL (ref 0.8–1.3)
EOSINOPHILS ABSOLUTE: 0 K/UL (ref 0–0.6)
EOSINOPHILS RELATIVE PERCENT: 0 %
EPITHELIAL CELLS, UA: ABNORMAL /HPF (ref 0–5)
GFR AFRICAN AMERICAN: >60
GFR NON-AFRICAN AMERICAN: >60
GLOBULIN: 2.6 G/DL
GLUCOSE BLD-MCNC: 132 MG/DL (ref 70–99)
GLUCOSE URINE: NEGATIVE MG/DL
HCT VFR BLD CALC: 43.1 % (ref 40.5–52.5)
HEMOGLOBIN: 14.4 G/DL (ref 13.5–17.5)
KETONES, URINE: NEGATIVE MG/DL
LEUKOCYTE ESTERASE, URINE: ABNORMAL
LYMPHOCYTES ABSOLUTE: 1.3 K/UL (ref 1–5.1)
LYMPHOCYTES RELATIVE PERCENT: 9.4 %
MCH RBC QN AUTO: 27.9 PG (ref 26–34)
MCHC RBC AUTO-ENTMCNC: 33.5 G/DL (ref 31–36)
MCV RBC AUTO: 83.3 FL (ref 80–100)
MICROSCOPIC EXAMINATION: YES
MONOCYTES ABSOLUTE: 0.4 K/UL (ref 0–1.3)
MONOCYTES RELATIVE PERCENT: 2.8 %
MUCUS: ABNORMAL /LPF
NEUTROPHILS ABSOLUTE: 12.4 K/UL (ref 1.7–7.7)
NEUTROPHILS RELATIVE PERCENT: 87 %
NITRITE, URINE: NEGATIVE
PDW BLD-RTO: 14.1 % (ref 12.4–15.4)
PH UA: 6 (ref 5–8)
PLATELET # BLD: 199 K/UL (ref 135–450)
PMV BLD AUTO: 8.6 FL (ref 5–10.5)
POTASSIUM REFLEX MAGNESIUM: 4.1 MMOL/L (ref 3.5–5.1)
PROTEIN UA: 30 MG/DL
RBC # BLD: 5.17 M/UL (ref 4.2–5.9)
RBC UA: ABNORMAL /HPF (ref 0–4)
SODIUM BLD-SCNC: 138 MMOL/L (ref 136–145)
SPECIFIC GRAVITY UA: 1.02 (ref 1–1.03)
TOTAL PROTEIN: 7.2 G/DL (ref 6.4–8.2)
URINE TYPE: ABNORMAL
UROBILINOGEN, URINE: 0.2 E.U./DL
WBC # BLD: 14.3 K/UL (ref 4–11)
WBC UA: ABNORMAL /HPF (ref 0–5)

## 2021-02-01 PROCEDURE — 81001 URINALYSIS AUTO W/SCOPE: CPT

## 2021-02-01 PROCEDURE — 99283 EMERGENCY DEPT VISIT LOW MDM: CPT

## 2021-02-01 PROCEDURE — 85025 COMPLETE CBC W/AUTO DIFF WBC: CPT

## 2021-02-01 PROCEDURE — 80053 COMPREHEN METABOLIC PANEL: CPT

## 2021-02-01 PROCEDURE — 96374 THER/PROPH/DIAG INJ IV PUSH: CPT

## 2021-02-01 PROCEDURE — 74176 CT ABD & PELVIS W/O CONTRAST: CPT

## 2021-02-01 PROCEDURE — 6370000000 HC RX 637 (ALT 250 FOR IP): Performed by: STUDENT IN AN ORGANIZED HEALTH CARE EDUCATION/TRAINING PROGRAM

## 2021-02-01 PROCEDURE — 6360000002 HC RX W HCPCS: Performed by: STUDENT IN AN ORGANIZED HEALTH CARE EDUCATION/TRAINING PROGRAM

## 2021-02-01 PROCEDURE — 96375 TX/PRO/DX INJ NEW DRUG ADDON: CPT

## 2021-02-01 PROCEDURE — 2580000003 HC RX 258: Performed by: STUDENT IN AN ORGANIZED HEALTH CARE EDUCATION/TRAINING PROGRAM

## 2021-02-01 RX ORDER — TAMSULOSIN HYDROCHLORIDE 0.4 MG/1
0.4 CAPSULE ORAL DAILY
Qty: 5 CAPSULE | Refills: 0 | Status: ON HOLD | OUTPATIENT
Start: 2021-02-01 | End: 2021-07-25

## 2021-02-01 RX ORDER — TAMSULOSIN HYDROCHLORIDE 0.4 MG/1
0.4 CAPSULE ORAL DAILY
Status: DISCONTINUED | OUTPATIENT
Start: 2021-02-02 | End: 2021-02-01

## 2021-02-01 RX ORDER — 0.9 % SODIUM CHLORIDE 0.9 %
1000 INTRAVENOUS SOLUTION INTRAVENOUS ONCE
Status: COMPLETED | OUTPATIENT
Start: 2021-02-01 | End: 2021-02-01

## 2021-02-01 RX ORDER — ONDANSETRON 2 MG/ML
4 INJECTION INTRAMUSCULAR; INTRAVENOUS ONCE
Status: COMPLETED | OUTPATIENT
Start: 2021-02-01 | End: 2021-02-01

## 2021-02-01 RX ORDER — TAMSULOSIN HYDROCHLORIDE 0.4 MG/1
0.4 CAPSULE ORAL ONCE
Status: COMPLETED | OUTPATIENT
Start: 2021-02-01 | End: 2021-02-01

## 2021-02-01 RX ORDER — KETOROLAC TROMETHAMINE 30 MG/ML
15 INJECTION, SOLUTION INTRAMUSCULAR; INTRAVENOUS ONCE
Status: COMPLETED | OUTPATIENT
Start: 2021-02-01 | End: 2021-02-01

## 2021-02-01 RX ADMIN — SODIUM CHLORIDE 1000 ML: 9 INJECTION, SOLUTION INTRAVENOUS at 20:31

## 2021-02-01 RX ADMIN — ONDANSETRON 4 MG: 2 INJECTION INTRAMUSCULAR; INTRAVENOUS at 20:34

## 2021-02-01 RX ADMIN — TAMSULOSIN HYDROCHLORIDE 0.4 MG: 0.4 CAPSULE ORAL at 22:11

## 2021-02-01 RX ADMIN — KETOROLAC TROMETHAMINE 15 MG: 30 INJECTION, SOLUTION INTRAMUSCULAR at 20:31

## 2021-02-01 ASSESSMENT — PAIN DESCRIPTION - LOCATION: LOCATION: ABDOMEN

## 2021-02-01 ASSESSMENT — ENCOUNTER SYMPTOMS
BACK PAIN: 1
COUGH: 0
CONSTIPATION: 0
DIARRHEA: 0
SORE THROAT: 0
ABDOMINAL PAIN: 0
PHOTOPHOBIA: 0
NAUSEA: 1
VOMITING: 1
RHINORRHEA: 0
SHORTNESS OF BREATH: 0

## 2021-02-01 ASSESSMENT — PAIN DESCRIPTION - FREQUENCY: FREQUENCY: CONTINUOUS

## 2021-02-01 ASSESSMENT — PAIN SCALES - GENERAL
PAINLEVEL_OUTOF10: 7
PAINLEVEL_OUTOF10: 0

## 2021-02-01 ASSESSMENT — PAIN DESCRIPTION - DESCRIPTORS: DESCRIPTORS: ACHING

## 2021-02-02 NOTE — ED PROVIDER NOTES
LITHOTRIPSY,  STONE EXTRACTION, LEFT STENT PLACEMENT performed by Ha Claros MD at [facility]  09/14/2020    left stent removal    CYSTOSCOPY INSERTION / REMOVAL STENT / STONE Left 9/14/2020    CYSTOSCOPY, LEFT STENT REMOVAL performed by Ha Claros MD at [address] [place] [address]       Previous Medications    AMLODIPINE (NORVASC) 10 MG TABLET    Take 10 mg by mouth daily. ASPIRIN 81 MG TABLET    Take 81 mg by mouth daily. ATORVASTATIN (LIPITOR) 40 MG TABLET    Take 40 mg by mouth daily. LORATADINE 10 MG CAPS    Take  by mouth. METOPROLOL (LOPRESSOR) 50 MG TABLET    Take 50 mg by mouth 2 times daily.     ONDANSETRON (ZOFRAN) 4 MG TABLET    Take 1 tablet by mouth every 8 hours as needed for Nausea       ALLERGIES     Gluten meal    FAMILY HISTORY       Family History   Problem Relation Age of Onset    Heart Disease Mother     Stroke Mother           SOCIAL HISTORY       Social History     Socioeconomic History    Marital status:      Spouse name: None    Number of children: None    Years of education: None    Highest education level: None   Occupational History    None   Social Needs    Financial resource strain: None    Food insecurity     Worry: None     Inability: None    Transportation needs     Medical: None     Non-medical: None   Tobacco Use    Smoking status: Former Smoker    Smokeless tobacco: Never Used    Tobacco comment: quit 1977, pack a day   Substance and Sexual Activity    Alcohol use: No    Drug use: None    Sexual activity: None   Lifestyle    Physical activity     Days per week: None     Minutes per session: None    Stress: None   Relationships    Social connections     Talks on phone: None     Gets together: None     Attends Oriental orthodox service: None     Active member of club or organization: None     Attends meetings of clubs or organizations: None     Relationship status: None    Intimate partner violence     Fear of current or ex partner: None     Emotionally abused: None     Physically abused: None     Forced sexual activity: None   Other Topics Concern    None   Social History Narrative    None       SCREENINGS                            REVIEW OF SYSTEMS    (2-9 systems for level 4, 10 or more for level 5)   Review of Systems   Constitutional: Negative for chills, fatigue and fever. HENT: Negative for congestion, rhinorrhea and sore throat. Eyes: Negative for photophobia and visual disturbance. Respiratory: Negative for cough and shortness of breath. Cardiovascular: Negative for chest pain and palpitations. Gastrointestinal: Positive for nausea and vomiting. Negative for abdominal pain, constipation and diarrhea. Genitourinary: Positive for flank pain. Negative for decreased urine volume. Musculoskeletal: Positive for back pain. Negative for neck pain and neck stiffness. Skin: Negative for rash. Psychiatric/Behavioral: Negative for confusion. PHYSICAL EXAM    (up to 7 for level 4, 8 or more for level 5)     ED Triage Vitals   BP Temp Temp src Pulse Resp SpO2 Height Weight   -- -- -- -- -- -- -- --       Physical Exam  Constitutional:       General: He is not in acute distress. Appearance: He is not diaphoretic. HENT:      Head: Normocephalic and atraumatic. Eyes:      Pupils: Pupils are equal, round, and reactive to light. Neck:      Musculoskeletal: Normal range of motion and neck supple. Trachea: No tracheal deviation. Cardiovascular:      Rate and Rhythm: Normal rate and regular rhythm. Pulmonary:      Effort: Pulmonary effort is normal. No respiratory distress. Breath sounds: No stridor. No wheezing. Abdominal:      General: There is no distension. Palpations: Abdomen is soft. Tenderness: There is no abdominal tenderness. Musculoskeletal: Normal range of motion. General: No deformity.    Skin:     General: Skin is warm and dry. Neurological:      Mental Status: He is alert and oriented to person, place, and time. DIAGNOSTIC RESULTS     EKG: All EKG's are interpreted by the Emergency Department Physician who either signs or Co-signs this chart in the absence of a cardiologist.      RADIOLOGY:   Non-plain film images such as CT, Ultrasound and MRI are read by the radiologist. Plain radiographic images are visualized and preliminarily interpreted by the emergency physician. Interpretation per the Radiologist below, if available at the time of this note:    CT ABDOMEN PELVIS WO CONTRAST   Final Result   3 mm calculus in the distal right ureter with associated mild right   hydronephrosis and hydroureter. There is a 2 mm nonobstructive calculus in the right renal pelvis. Brachytherapy beads in the prostate gland.                LABS:  Labs Reviewed   CBC WITH AUTO DIFFERENTIAL - Abnormal; Notable for the following components:       Result Value    WBC 14.3 (*)     Neutrophils Absolute 12.4 (*)     All other components within normal limits    Narrative:     Performed at:  [place] [address],  [employer] [facility]   Phone [phone]   COMPREHENSIVE METABOLIC PANEL W/ REFLEX TO MG FOR LOW K - Abnormal; Notable for the following components:    Glucose 132 (*)     All other components within normal limits    Narrative:     Performed at:  [place] [address],  [employer] [facility]   Phone [phone]   URINALYSIS - Abnormal; Notable for the following components:    Clarity, UA SL CLOUDY (*)     Bilirubin Urine SMALL (*)     Blood, Urine LARGE (*)     Protein, UA 30 (*)     Leukocyte Esterase, Urine TRACE (*)     All other components within normal limits    Narrative:     Performed at:  [facility],  [employer] [facility]   Phone [phone]   MICROSCOPIC URINALYSIS - Abnormal; Notable for the following components:    Mucus, UA 2+ (*)     RBC, UA  (*)     Bacteria, UA 1+ (*)     All other components within normal limits    Narrative:     Performed at:  [facility]  [address],  [place], [facility]   Phone [phone]       All other labs were within normal range or not returned as of this dictation. EMERGENCY DEPARTMENT COURSE and DIFFERENTIAL DIAGNOSIS/MDM:   Elmer Valencia is a 79 y.o. male who presents to the emergency department with the complaint of right flank pain, patient is attributing this to prior episodes of kidney stones. As this feels the same. No fevers no chills. Does complain of nausea vomiting earlier in the day, inability tolerate p.o. intake. Will treat with IV fluids, antiemetics, pain control with Toradol at first.  Patient's vitals are stable except for mild hypertension without headaches vision change or focal deficit. Patient rating of pain 8/10 however appears comfortable in room at this time. No CVA tenderness. Obtain urinalysis, basic labs. Patient does report prior history of large stone requiring surgical removal.  Will obtain CT abdomen pelvis without contrast to evaluate for kidney stone. Patient denying pain into the chest.  Pain is located in the lower back/right flank at this time. Patient was found to have 3 mm stone with mild hydroureter hydronephrosis. Pain appears well-controlled this time. No signs of infection. Will start patient on Flomax. Patient is comfortable with outpatient follow-up with urology for trial of passage of stone. Patient was given return precautions for worsening pain, fevers development of abdominal pain nausea vomiting inability tolerate p.o. intake, development of fevers she needs to return admitted to the ER for reevaluation if these were to occur.         CRITICAL CARE TIME   Total Critical Care time was 0 minutes, excluding separately reportable procedures. There was a high probability of clinically significant/life threatening deterioration in the patient's condition which required my urgent intervention. Clinical concern  Intervention     CONSULTS:  None    PROCEDURES:  Unless otherwise noted below, none     Procedures        FINAL IMPRESSION      1. Kidney stone          DISPOSITION/PLAN   DISPOSITION  dc      PATIENT REFERRED TO:  [address]  [address] [facility]  [phone]    If symptoms worsen    Urology  Please call your urologist tomorrow 2/2/2021 to discuss recurrent kidney stones. Schedule an appointment as soon as possible for a visit         DISCHARGE MEDICATIONS:  New Prescriptions    TAMSULOSIN (FLOMAX) 0.4 MG CAPSULE    Take 1 capsule by mouth daily for 5 doses     Controlled Substances Monitoring:     No flowsheet data found.     (Please note that portions of this note were completed with a voice recognition program.  Efforts were made to edit the dictations but occasionally words are mis-transcribed.)    Christiano Wallace DO (electronically signed)  Attending Emergency Physician            Christiano Wallace DO  02/01/21 0266

## 2021-07-25 ENCOUNTER — HOSPITAL ENCOUNTER (OUTPATIENT)
Age: 71
Setting detail: OBSERVATION
Discharge: HOME | End: 2021-07-26
Attending: STUDENT IN AN ORGANIZED HEALTH CARE EDUCATION/TRAINING PROGRAM | Admitting: INTERNAL MEDICINE
Payer: MEDICARE

## 2021-07-25 DIAGNOSIS — R42 LIGHTHEADEDNESS: ICD-10-CM

## 2021-07-25 DIAGNOSIS — T50.905A BRADYCARDIA, DRUG INDUCED: Primary | ICD-10-CM

## 2021-07-25 DIAGNOSIS — R00.1 BRADYCARDIA, DRUG INDUCED: Primary | ICD-10-CM

## 2021-07-25 LAB
A/G RATIO: 1.9 (ref 1.1–2.2)
ALBUMIN SERPL-MCNC: 4.5 G/DL (ref 3.4–5)
ALP BLD-CCNC: 96 U/L (ref 40–129)
ALT SERPL-CCNC: 33 U/L (ref 10–40)
ANION GAP SERPL CALCULATED.3IONS-SCNC: 7 MMOL/L (ref 3–16)
AST SERPL-CCNC: 21 U/L (ref 15–37)
BASOPHILS ABSOLUTE: 0.1 K/UL (ref 0–0.2)
BASOPHILS RELATIVE PERCENT: 0.9 %
BILIRUB SERPL-MCNC: 0.5 MG/DL (ref 0–1)
BUN BLDV-MCNC: 14 MG/DL (ref 7–20)
CALCIUM SERPL-MCNC: 9.6 MG/DL (ref 8.3–10.6)
CHLORIDE BLD-SCNC: 106 MMOL/L (ref 99–110)
CO2: 25 MMOL/L (ref 21–32)
CREAT SERPL-MCNC: 0.6 MG/DL (ref 0.8–1.3)
EKG ATRIAL RATE: 56 BPM
EKG DIAGNOSIS: NORMAL
EKG P AXIS: 39 DEGREES
EKG P-R INTERVAL: 158 MS
EKG Q-T INTERVAL: 420 MS
EKG QRS DURATION: 96 MS
EKG QTC CALCULATION (BAZETT): 405 MS
EKG R AXIS: 17 DEGREES
EKG T AXIS: 39 DEGREES
EKG VENTRICULAR RATE: 56 BPM
EOSINOPHILS ABSOLUTE: 0.3 K/UL (ref 0–0.6)
EOSINOPHILS RELATIVE PERCENT: 3.4 %
GFR AFRICAN AMERICAN: >60
GFR NON-AFRICAN AMERICAN: >60
GLOBULIN: 2.4 G/DL
GLUCOSE BLD-MCNC: 136 MG/DL (ref 70–99)
HCT VFR BLD CALC: 45.3 % (ref 40.5–52.5)
HEMOGLOBIN: 15 G/DL (ref 13.5–17.5)
LYMPHOCYTES ABSOLUTE: 2 K/UL (ref 1–5.1)
LYMPHOCYTES RELATIVE PERCENT: 22 %
MCH RBC QN AUTO: 28.2 PG (ref 26–34)
MCHC RBC AUTO-ENTMCNC: 33 G/DL (ref 31–36)
MCV RBC AUTO: 85.4 FL (ref 80–100)
MONOCYTES ABSOLUTE: 0.6 K/UL (ref 0–1.3)
MONOCYTES RELATIVE PERCENT: 6.2 %
NEUTROPHILS ABSOLUTE: 6.3 K/UL (ref 1.7–7.7)
NEUTROPHILS RELATIVE PERCENT: 67.5 %
PDW BLD-RTO: 14.2 % (ref 12.4–15.4)
PLATELET # BLD: 183 K/UL (ref 135–450)
PMV BLD AUTO: 9 FL (ref 5–10.5)
POTASSIUM REFLEX MAGNESIUM: 4.2 MMOL/L (ref 3.5–5.1)
RBC # BLD: 5.31 M/UL (ref 4.2–5.9)
SODIUM BLD-SCNC: 138 MMOL/L (ref 136–145)
TOTAL PROTEIN: 6.9 G/DL (ref 6.4–8.2)
TROPONIN: <0.01 NG/ML
TROPONIN: <0.01 NG/ML
WBC # BLD: 9.3 K/UL (ref 4–11)

## 2021-07-25 PROCEDURE — 96372 THER/PROPH/DIAG INJ SC/IM: CPT

## 2021-07-25 PROCEDURE — 85025 COMPLETE CBC W/AUTO DIFF WBC: CPT

## 2021-07-25 PROCEDURE — 6360000002 HC RX W HCPCS: Performed by: INTERNAL MEDICINE

## 2021-07-25 PROCEDURE — 36415 COLL VENOUS BLD VENIPUNCTURE: CPT

## 2021-07-25 PROCEDURE — 2580000003 HC RX 258: Performed by: INTERNAL MEDICINE

## 2021-07-25 PROCEDURE — G0378 HOSPITAL OBSERVATION PER HR: HCPCS

## 2021-07-25 PROCEDURE — 93005 ELECTROCARDIOGRAM TRACING: CPT | Performed by: EMERGENCY MEDICINE

## 2021-07-25 PROCEDURE — 80179 DRUG ASSAY SALICYLATE: CPT

## 2021-07-25 PROCEDURE — 6370000000 HC RX 637 (ALT 250 FOR IP): Performed by: INTERNAL MEDICINE

## 2021-07-25 PROCEDURE — 93010 ELECTROCARDIOGRAM REPORT: CPT | Performed by: INTERNAL MEDICINE

## 2021-07-25 PROCEDURE — 80143 DRUG ASSAY ACETAMINOPHEN: CPT

## 2021-07-25 PROCEDURE — 80053 COMPREHEN METABOLIC PANEL: CPT

## 2021-07-25 PROCEDURE — 2580000003 HC RX 258: Performed by: STUDENT IN AN ORGANIZED HEALTH CARE EDUCATION/TRAINING PROGRAM

## 2021-07-25 PROCEDURE — 99285 EMERGENCY DEPT VISIT HI MDM: CPT

## 2021-07-25 PROCEDURE — 84484 ASSAY OF TROPONIN QUANT: CPT

## 2021-07-25 RX ORDER — SODIUM CHLORIDE 0.9 % (FLUSH) 0.9 %
5-40 SYRINGE (ML) INJECTION PRN
Status: DISCONTINUED | OUTPATIENT
Start: 2021-07-25 | End: 2021-07-26 | Stop reason: HOSPADM

## 2021-07-25 RX ORDER — SODIUM CHLORIDE 0.9 % (FLUSH) 0.9 %
5-40 SYRINGE (ML) INJECTION EVERY 12 HOURS SCHEDULED
Status: DISCONTINUED | OUTPATIENT
Start: 2021-07-25 | End: 2021-07-26 | Stop reason: HOSPADM

## 2021-07-25 RX ORDER — ASPIRIN 81 MG/1
81 TABLET, CHEWABLE ORAL DAILY
Status: DISCONTINUED | OUTPATIENT
Start: 2021-07-25 | End: 2021-07-26 | Stop reason: HOSPADM

## 2021-07-25 RX ORDER — 0.9 % SODIUM CHLORIDE 0.9 %
1000 INTRAVENOUS SOLUTION INTRAVENOUS ONCE
Status: COMPLETED | OUTPATIENT
Start: 2021-07-25 | End: 2021-07-25

## 2021-07-25 RX ORDER — POLYETHYLENE GLYCOL 3350 17 G/17G
17 POWDER, FOR SOLUTION ORAL DAILY PRN
Status: DISCONTINUED | OUTPATIENT
Start: 2021-07-25 | End: 2021-07-26 | Stop reason: HOSPADM

## 2021-07-25 RX ORDER — FLUOXETINE 10 MG/1
10 CAPSULE ORAL DAILY
COMMUNITY

## 2021-07-25 RX ORDER — ACETAMINOPHEN 325 MG/1
650 TABLET ORAL EVERY 6 HOURS PRN
Status: DISCONTINUED | OUTPATIENT
Start: 2021-07-25 | End: 2021-07-26 | Stop reason: HOSPADM

## 2021-07-25 RX ORDER — FLUOXETINE 10 MG/1
10 CAPSULE ORAL DAILY
Status: DISCONTINUED | OUTPATIENT
Start: 2021-07-25 | End: 2021-07-26 | Stop reason: HOSPADM

## 2021-07-25 RX ORDER — ONDANSETRON 4 MG/1
4 TABLET, ORALLY DISINTEGRATING ORAL EVERY 8 HOURS PRN
Status: DISCONTINUED | OUTPATIENT
Start: 2021-07-25 | End: 2021-07-26 | Stop reason: HOSPADM

## 2021-07-25 RX ORDER — ONDANSETRON 2 MG/ML
4 INJECTION INTRAMUSCULAR; INTRAVENOUS EVERY 6 HOURS PRN
Status: DISCONTINUED | OUTPATIENT
Start: 2021-07-25 | End: 2021-07-26 | Stop reason: HOSPADM

## 2021-07-25 RX ORDER — ATORVASTATIN CALCIUM 40 MG/1
40 TABLET, FILM COATED ORAL DAILY
Status: DISCONTINUED | OUTPATIENT
Start: 2021-07-25 | End: 2021-07-26 | Stop reason: HOSPADM

## 2021-07-25 RX ORDER — ACETAMINOPHEN 650 MG/1
650 SUPPOSITORY RECTAL EVERY 6 HOURS PRN
Status: DISCONTINUED | OUTPATIENT
Start: 2021-07-25 | End: 2021-07-26 | Stop reason: HOSPADM

## 2021-07-25 RX ORDER — SODIUM CHLORIDE 9 MG/ML
25 INJECTION, SOLUTION INTRAVENOUS PRN
Status: DISCONTINUED | OUTPATIENT
Start: 2021-07-25 | End: 2021-07-26 | Stop reason: HOSPADM

## 2021-07-25 RX ADMIN — SODIUM CHLORIDE 1000 ML: 9 INJECTION, SOLUTION INTRAVENOUS at 16:58

## 2021-07-25 RX ADMIN — Medication 10 ML: at 21:10

## 2021-07-25 RX ADMIN — ENOXAPARIN SODIUM 40 MG: 40 INJECTION SUBCUTANEOUS at 18:29

## 2021-07-25 RX ADMIN — FLUOXETINE 10 MG: 10 CAPSULE ORAL at 18:29

## 2021-07-25 NOTE — ED NOTES
Report given to Chinmay Warren RN patient taken to floor via wheelchair in stable condition with all belongings.       David Marshall RN  07/25/21 2759

## 2021-07-25 NOTE — ED NOTES
Consult to Cardiology at  [id number] completed with call back from Dr. Alicia Gallardo at [facility]  07/25/21 0991

## 2021-07-25 NOTE — ED PROVIDER NOTES
[address] ED      CHIEF COMPLAINT  Bradycardia, lightheadedness     HISTORY OF PRESENT ILLNESS  Elise Pickens is a 79 y.o. male with a past medical history of hypertension, hyperlipidemia, depression, and PTSD, who presents to the ED complaining of lightheadedness. Patient states that he ran out of his Prozac, he took his last dose yesterday. He was feeling anxious overnight so took a dose of his prazosin which is a medication for blood pressure that he had been taken off of 2 months ago due to poor sleep while on this medication. He understood that this was a blood pressure medication and not a psychiatric medication but took it anyway. He states he only took his typical dose which is 10 mg. He would normally take this prior to going to bed but this time took it at 2 AM.  When the patient woke up between 7 and 8 AM, he took his normal home morning medications including amlodipine and metoprolol. He denies any other new medications or any other ingestions. Patient had an episode this morning when he was lightheaded, had nausea but no vomiting and had some sweating. Lightheadedness but no syncope. No palliative or provocative factors, nonpositional.  He denied any vertiginous symptoms, numbness, weakness, tingling. Patient continues to have mild lightheadedness, no chest pain or shortness of breath. Patient was seen by his primary care doctor where he was found to be bradycardic and have a blood pressure of 96/60. He was sent to the emergency department for further evaluation. Old records reviewed: Patient was most recently seen in the [facility] system in February 2021 for kidney stone. He does also follow at the [facility] and I was able to see that he was on 10 mg of prazosin on their medication list.    No other complaints, modifying factors or associated symptoms. I have reviewed the following from the nursing documentation.     Past Medical History:   Diagnosis Date    Hyperlipidemia  Hypertension      Past Surgical History:   Procedure Laterality Date    CYSTOSCOPY Left 08/17/2020    LEFT URETEROSCOPY WITH HOLMIUM LASER LITHOTRIPSY,  STONE EXTRACTION, LEFT STENT PLACEMENT (    CYSTOSCOPY Left 8/17/2020    CYSTOSCOPY, LEFT URETEROSCOPY WITH HOLMIUM LASER LITHOTRIPSY,  STONE EXTRACTION, LEFT STENT PLACEMENT performed by Derrick Alvarado MD at hospitals  09/14/2020    left stent removal    CYSTOSCOPY INSERTION / REMOVAL STENT / STONE Left 9/14/2020    CYSTOSCOPY, LEFT STENT REMOVAL performed by Derrick Alvarado MD at [address] [place] [address]       Family History   Problem Relation Age of Onset    Heart Disease Mother     Stroke Mother      Social History     Socioeconomic History    Marital status:      Spouse name: Not on file    Number of children: Not on file    Years of education: Not on file    Highest education level: Not on file   Occupational History    Not on file   Tobacco Use    Smoking status: Former Smoker    Smokeless tobacco: Never Used    Tobacco comment: quit 1977, pack a day   Substance and Sexual Activity    Alcohol use: No    Drug use: Not on file    Sexual activity: Not on file   Other Topics Concern    Not on file   Social History Narrative    Not on file     Social Determinants of Health     Financial Resource Strain:     Difficulty of Paying Living Expenses:    Food Insecurity:     Worried About [address] [employer] in the Last Year:     [address] in the Last Year:    Transportation Needs:     Lack of Transportation (Medical):      Lack of Transportation (Non-Medical):    Physical Activity:     Days of Exercise per Week:     Minutes of Exercise per Session:    Stress:     Feeling of Stress :    Social Connections:     Frequency of Communication with Friends and Family:     Frequency of Social Gatherings with Friends and Family:     Attends Mandaen Services:     Active Member of Clubs or Organizations:     Attends Club or Organization Meetings:     Marital Status:    Intimate Partner Violence:     Fear of Current or Ex-Partner:     Emotionally Abused:     Physically Abused:     Sexually Abused:      No current facility-administered medications for this encounter. Current Outpatient Medications   Medication Sig Dispense Refill    FLUoxetine (PROZAC) 10 MG capsule Take 10 mg by mouth daily      tamsulosin (FLOMAX) 0.4 MG capsule Take 1 capsule by mouth daily for 5 doses 5 capsule 0    ondansetron (ZOFRAN) 4 MG tablet Take 1 tablet by mouth every 8 hours as needed for Nausea 20 tablet 0    metoprolol (LOPRESSOR) 50 MG tablet Take 50 mg by mouth 2 times daily.  atorvastatin (LIPITOR) 40 MG tablet Take 40 mg by mouth daily.  amLODIPine (NORVASC) 10 MG tablet Take 10 mg by mouth daily.  aspirin 81 MG tablet Take 81 mg by mouth daily.  Loratadine 10 MG CAPS Take  by mouth. Allergies   Allergen Reactions    Gluten Meal        REVIEW OF SYSTEMS  All systems reviewed, pertinent positives per HPI otherwise noted to be negative. PHYSICAL EXAM  /64   Pulse 56   Resp 11   Ht 5' 6\" (1.676 m)   Wt 224 lb (101.6 kg)   SpO2 96%   BMI 36.15 kg/m²    GENERAL APPEARANCE: Awake and alert. Cooperative. no distress. HENT: Normocephalic. Atraumatic. Mucous membranes are moist  NECK: Supple. Full range of motion of the neck without stiffness or pain. EYES: PERRL. EOM's grossly intact. HEART/CHEST: RRR. No murmurs. Chest wall is not tender to palpation. LUNGS: Respirations unlabored. CTAB. Good air exchange. Speaking comfortably in full sentences. ABDOMEN: No tenderness. Soft. Non-distended. No masses. No organomegaly. No guarding or rebound. MUSCULOSKELETAL: No extremity edema. Compartments soft. No deformity. No tenderness in the extremities. All extremities neurovascularly intact. SKIN: Warm and dry. No acute rashes. NEUROLOGICAL: Alert and oriented. CN's 2-12 intact. No gross facial drooping. Strength 5/5, sensation intact. NIH stroke scale of 0.  PSYCHIATRIC: Normal mood and affect. LABS  I have reviewed all labs for this visit.    Results for orders placed or performed during the hospital encounter of 07/25/21   CBC Auto Differential   Result Value Ref Range    WBC 9.3 4.0 - 11.0 K/uL    RBC 5.31 4.20 - 5.90 M/uL    Hemoglobin 15.0 13.5 - 17.5 g/dL    Hematocrit 45.3 40.5 - 52.5 %    MCV 85.4 80.0 - 100.0 fL    MCH 28.2 26.0 - 34.0 pg    MCHC 33.0 31.0 - 36.0 g/dL    RDW 14.2 12.4 - 15.4 %    Platelets 558 303 - 966 K/uL    MPV 9.0 5.0 - 10.5 fL    Neutrophils % 67.5 %    Lymphocytes % 22.0 %    Monocytes % 6.2 %    Eosinophils % 3.4 %    Basophils % 0.9 %    Neutrophils Absolute 6.3 1.7 - 7.7 K/uL    Lymphocytes Absolute 2.0 1.0 - 5.1 K/uL    Monocytes Absolute 0.6 0.0 - 1.3 K/uL    Eosinophils Absolute 0.3 0.0 - 0.6 K/uL    Basophils Absolute 0.1 0.0 - 0.2 K/uL   Comprehensive Metabolic Panel w/ Reflex to MG   Result Value Ref Range    Sodium 138 136 - 145 mmol/L    Potassium reflex Magnesium 4.2 3.5 - 5.1 mmol/L    Chloride 106 99 - 110 mmol/L    CO2 25 21 - 32 mmol/L    Anion Gap 7 3 - 16    Glucose 136 (H) 70 - 99 mg/dL    BUN 14 7 - 20 mg/dL    CREATININE 0.6 (L) 0.8 - 1.3 mg/dL    GFR Non-African American >60 >60    GFR African American >60 >60    Calcium 9.6 8.3 - 10.6 mg/dL    Total Protein 6.9 6.4 - 8.2 g/dL    Albumin 4.5 3.4 - 5.0 g/dL    Albumin/Globulin Ratio 1.9 1.1 - 2.2    Total Bilirubin 0.5 0.0 - 1.0 mg/dL    Alkaline Phosphatase 96 40 - 129 U/L    ALT 33 10 - 40 U/L    AST 21 15 - 37 U/L    Globulin 2.4 g/dL   Troponin   Result Value Ref Range    Troponin <0.01 <0.01 ng/mL   EKG 12 Lead   Result Value Ref Range    Ventricular Rate 56 BPM    Atrial Rate 56 BPM    P-R Interval 158 ms    QRS Duration 96 ms    Q-T Interval 420 ms    QTc Calculation (Bazett) 405 ms    P Axis 39 degrees    R Axis 17 degrees    T Axis 39 degrees    Diagnosis Sinus bradycardiaOtherwise normal ECGConfirmed by Berry Alan MD, [facility] ([id number]) on 7/25/2021 7:51:15 PM       ECG  The Ekg interpreted by me shows  sinus bradycardia, rate=56  Axis is   Normal  QTc is  within an acceptable range  Intervals and Durations are unremarkable. ST Segments: normal  No significant change from prior EKG dated 2015    RADIOLOGY    No orders to display          ED COURSE / MDM  Patient seen and evaluated. Old records reviewed and pertinent information included in HPI. Labs and imaging reviewed and results discussed with patient. Overall well appearing patient, in no acute distress, presenting for lightheadedness. He was sent by his PCP for bradycardia and hypotension. He did take a dose of prazosin which he is no longer prescribed along with his prescribed blood pressure medications including metoprolol. Physical exam remarkable for bradycardia. Differential diagnosis includes but is not limited to: Medication reaction, arrhythmia, AV block, orthostatic hypotension, vasovagal episode, electrolyte abnormality, ACS, hypoglycemia    EKG, laboratory studies, and imaging obtained. Workup showed: Workup showed:  ED Course as of Jul 26 1928   Sun Jul 25, 2021   1612 No electrolyte abnormalities or evidence of kidney dysfunction.    [ER]   1612 Liver function testing unremarkable. [ER]   1612 No leukocytosis, anemia, thrombocytopenia. [ER]   1613 Troponin within normal limits. [ER]   3154 The Ekg interpreted by me shows  sinus bradycardia, rate=56  Axis is   Normal  QTc is  within an acceptable range  Intervals and Durations are unremarkable. ST Segments: normal  No significant change from prior EKG dated 2015    [ER]   1616 On review of the medication prazosin, it does appear that it can cause bradycardia. Will discuss with cardiology to compartment feel that the patient can remain at [facility]. [ER]   1617 Orthostatic vital signs negative.     [ER]   9937 minimum overnight monitoring with telemetry to follow resolution of bradycardia. Discussed the patient with hospital team, Dr Zulma Mena. CLINICAL IMPRESSION  1. Bradycardia, drug induced    2. Lightheadedness        Blood pressure 119/64, pulse 56, resp. rate 11, height 5' 6\" (1.676 m), weight 224 lb (101.6 kg), SpO2 96 %. Yeny Martinez was admitted in stable condition. DISCLAIMER: This chart was created using Dragon dictation software. Efforts were made by me to ensure accuracy, however some errors may be present due to limitations of this technology and occasionally words are not transcribed correctly.         Clement Sneed MD  07/27/21 2749

## 2021-07-25 NOTE — PROGRESS NOTES
Patient admitted to room 304 from ER. Patient oriented to room, call light, bed rails, phone, lights and bathroom. Patient instructed about the schedule of the day including: vital sign frequency, lab draws, possible tests, frequency of MD and staff rounds, daily weights, I &O's and prescribed diet. Telemetry box in place, patient aware of placement and reason. Bed locked, in lowest position, side rails up 2/4, call light within reach. Patient refused a four eye assessment. He has scattered scratches on the RLE, he verbalizes no other wounds. Recliner Assessment  Patient is able to demonstrate the ability to move from a reclining position to an upright position within the recliner.

## 2021-07-26 VITALS
TEMPERATURE: 97.4 F | HEART RATE: 74 BPM | WEIGHT: 218.25 LBS | BODY MASS INDEX: 35.08 KG/M2 | DIASTOLIC BLOOD PRESSURE: 84 MMHG | RESPIRATION RATE: 17 BRPM | OXYGEN SATURATION: 96 % | SYSTOLIC BLOOD PRESSURE: 118 MMHG | HEIGHT: 66 IN

## 2021-07-26 PROBLEM — R00.1 BRADYCARDIA: Status: RESOLVED | Noted: 2021-07-25 | Resolved: 2021-07-26

## 2021-07-26 LAB
ACETAMINOPHEN LEVEL: <5 UG/ML (ref 10–30)
SALICYLATE, SERUM: <0.3 MG/DL (ref 15–30)
TROPONIN: <0.01 NG/ML

## 2021-07-26 PROCEDURE — 2580000003 HC RX 258: Performed by: INTERNAL MEDICINE

## 2021-07-26 PROCEDURE — 99205 OFFICE O/P NEW HI 60 MIN: CPT | Performed by: INTERNAL MEDICINE

## 2021-07-26 PROCEDURE — G0378 HOSPITAL OBSERVATION PER HR: HCPCS

## 2021-07-26 PROCEDURE — 6370000000 HC RX 637 (ALT 250 FOR IP): Performed by: INTERNAL MEDICINE

## 2021-07-26 PROCEDURE — 99217 PR OBSERVATION CARE DISCHARGE MANAGEMENT: CPT | Performed by: PHYSICIAN ASSISTANT

## 2021-07-26 RX ORDER — METOPROLOL TARTRATE 50 MG/1
25 TABLET, FILM COATED ORAL 2 TIMES DAILY
Qty: 60 TABLET | Refills: 3 | Status: SHIPPED
Start: 2021-07-27

## 2021-07-26 RX ADMIN — FLUOXETINE 10 MG: 10 CAPSULE ORAL at 10:14

## 2021-07-26 RX ADMIN — ATORVASTATIN CALCIUM 40 MG: 40 TABLET, FILM COATED ORAL at 10:14

## 2021-07-26 RX ADMIN — ASPIRIN 81 MG: 81 TABLET, CHEWABLE ORAL at 10:14

## 2021-07-26 RX ADMIN — Medication 10 ML: at 10:15

## 2021-07-26 ASSESSMENT — PAIN SCALES - GENERAL: PAINLEVEL_OUTOF10: 0

## 2021-07-26 NOTE — CARE COORDINATION
Case Management Assessment  Initial Evaluation      Patient Name: Leroy Valdivia  YOB: 1950  Diagnosis: Bradycardia [R00.1]  Date / Time: 7/25/2021  1:52 PM    Admission status/Date:7/25/2021  Chart Reviewed: Yes      Patient Interviewed: Yes   Family Interviewed:  No      Hospitalization in the last 30 days:  No      Health Care Decision Maker :   Primary Decision Maker: Wayne Hansener - Spouse - [phone]    (CM - must 1st enter selection under Navigator - emergency contact- [place] Relationship and pick relationship)   Who do you trust or have selected to make healthcare decisions for you      Met with: pt  Interview conducted  (bedside/phone):bedside    Current PCP:   [address] required for SNF : N          3 night stay required - N    ADLS  Support Systems/Care Needs: Spouse/Significant Other, Children  Transportation: self    Meal Preparation: self    Housing  Living Arrangements: lives in 2 story house with spouse  Steps: 5  Intent for return to present living arrangements: Yes  Identified Issues:     [address] with [address] : No Agency:(Services)  Type of Home Care Services: None  Passport/Waiver : No  :                      Phone Number:    Passport/Waiver Services:           Durable Medical Equiptment   DME Provider: none  Equipment: none  Walker___Cane___RTS___ BSC___Shower Chair___Hospital Bed___W/C____Other________  02 at ____Liter(s)---wears(frequency)_______ HHN ___ CPAP___ BiPap___   N/A_x___      Home O2 Use :  No    If No for home O2---if presently on O2 during hospitalization:  No      Community Service Affiliation  Dialysis:  No    · Agency:  · Location:  · Dialysis Schedule:  · Phone:   · Fax: Other Community Services:none     DISCHARGE PLAN: Explained Case Management role/services. Chart reviewed. Pt from home with spouse and plans to return. Pt is IPTA +drives. Ambulates without device. No DC needs voiced or identified. CM will not follow, please consult us if dc needs should arise.  NN/NF

## 2021-07-26 NOTE — H&P
[address]                 [address] [address]                              HISTORY AND PHYSICAL    PATIENT NAME: Malissa Sapp                   :        1950  MED REC NO:   [id number]                          ROOM:         ACCOUNT NO:   [de-identified]                           ADMIT DATE: 2021  PROVIDER:     Jagjit Cuellar MD    I obtained the history and performed physical examination on the patient  on the Medical Floor on 2021. CHIEF COMPLAINT:  \"Feeling dizzy and lightheaded. \"    HISTORY OF PRESENT ILLNESS:  The patient is a 79-year-old  male  who states that the he ran out of his Prozac. Instead of Prozac, he  took prazosin that he had at home and then took his metoprolol and  amlodipine, after which he had one episode of sudden onset of gradually  progressive increasing lightheadedness and nausea without vomiting,  fevers, or chills. The patient never had any syncopal event or loss of  consciousness. At the time of my exam, he notes that these symptoms  have completely resolved and he is feeling otherwise well. PAST MEDICAL HISTORY:  1. Hypertension. 2.  Dyslipidemia. PAST SURGICAL HISTORY:  Cystoscopy. FAMILY HISTORY:  Reviewed by me and significant for heart disease and  stroke. SOCIAL HISTORY:  Former smoker, quit in . CURRENT MEDICATIONS:  Fluoxetine, Lopressor, Lipitor, Norvasc, aspirin. ALLERGIES:  No known drug allergies. REVIEW OF SYSTEMS:  Significant for lightheadedness and per the history  of present illness. All other systems reviewed and are negative except  for the history of present illness. PHYSICAL EXAMINATION:  VITAL SIGNS:  Temperature is 96.9, respiratory rate 16, pulse is 51,  blood pressure 139/72, saturating 95%. CNS:  Alert, awake, and oriented. PSYCH:  The patient is cooperative, answering questions appropriately.   HEENT:  Eyes:  Pupils are reactive to light. Extraocular muscle  movements are intact. RESPIRATORY SYSTEM:  Clear. CVS:  S1 and S2 are heard. Bradycardic rhythm. ABDOMEN:  Nondistended. MUSCULOSKELETAL EXAM:  No acute obvious deformities. SKIN:  Appears without rashes or lesions. DIAGNOSTIC DATA:  EKG shows sinus bradycardia. Troponin less than 0.01. BUN 14, creatinine 0.6. CBC showed a white count of 9.3. CONSULTATION REQUESTED:  Currently none. REVIEW OF PREVIOUS MEDICAL RECORDS:  Shows an echo from 07/2019, showing  an LV ejection fraction of 55% with grade 1 diastolic dysfunction. ASSESSMENT:  1. Symptomatic sinus bradycardia with presyncope secondary to  medication side effect. 2.  Hypertension. 3.  Dyslipidemia. 4.  Depression. PLAN OF CARE:  1. The patient admitted to Observation. 2.  Telemetry will be continued. 3.  Beta-blocker would be withheld. 4.  Medications need to be reconciled. CODE STATUS:  Full. EXPECTED LENGTH OF STAY:  Less than two midnights based on plan of care  above. RISK:  High due to the patient's presentation with beta-blocker induced  bradycardia. DISPOSITION:  Observation and telemetry.         Dao Munoz MD    D: 07/26/2021 1:17:29       T: 07/26/2021 2:35:05     [id number]  Job#: [id number]     Doc#: [id number]    CC:

## 2021-07-26 NOTE — DISCHARGE SUMMARY
Name:  James Duong  Room:  /0280-52  MRN:    [id number]    Discharge Summary      This discharge summary is in conjunction with a complete physical exam done on the day of discharge. Discharging Provider: Param Rodriguez PA-C      Admit: 7/25/2021  Discharge:   7/26/2021     HPI taken from admission H&P:    The patient is a 66-year-old  male  who states that the he ran out of his Prozac. Instead of Prozac, he  took prazosin that he had at home and then took his metoprolol and  amlodipine, after which he had one episode of sudden onset of gradually  progressive increasing lightheadedness and nausea without vomiting,  fevers, or chills. The patient never had any syncopal event or loss of  consciousness. At the time of my exam, he notes that these symptoms  have completely resolved and he is feeling otherwise well. Diagnoses this Admission and Hospital Course     #Symptomatic bradycardia  - suspect medication induced. Hold lopressor. He was already taken off of prazosin   - HR dropped to 39 overnight, but has since improved and remained stable in 50-60s. He is asymptomatic  - EKG not ischemic. Trop neg x 3  - monitored on tele  - consulted cardio: appreciate input. Do not resume prazosin. Can resume lopressor at half his prior dose- 25 mg BID starting 7/27/21  - see PCP 1 week      #HTN  - held lopressor- resume at half dose as noted above   - cont norvasc    #PTSD  #Depression  - cont prozac  - prazosin recently discontinued- do not resume      #HLD  - cont statin     Procedures (Please Review Full Report for Details)  None     Consults    Cardiology       Physical Exam at Discharge:    /67   Pulse 68   Temp 97.5 °F (36.4 °C) (Oral)   Resp 16   Ht 5' 6\" (1.676 m)   Wt 218 lb 4 oz (99 kg)   SpO2 95%   BMI 35.23 kg/m²     Gen: No distress. Alert. Eyes: PERRL. No sclera icterus. No conjunctival injection. ENT: No discharge. Pharynx clear. Neck: No JVD.   Trachea midline. Resp: No accessory muscle use. No crackles. No wheezes. No rhonchi. CV: Regular rate. Regular rhythm. No murmur. No rub. No edema. GI: Non-tender. Non-distended. Normal bowel sounds. Skin: Warm and dry. No nodule on exposed extremities. No rash on exposed extremities. Superficial abrasions to distal RLE   M/S: No cyanosis. No joint deformity. No clubbing. Neuro: Awake. Grossly nonfocal    Psych: Oriented x 3. No anxiety or agitation. CBC:   Recent Labs     07/25/21  1414   WBC 9.3   HGB 15.0   HCT 45.3   MCV 85.4        BMP:   Recent Labs     07/25/21  1414      K 4.2      CO2 25   BUN 14   CREATININE 0.6*     LIVER PROFILE:   Recent Labs     07/25/21  1414   AST 21   ALT 33   BILITOT 0.5   ALKPHOS 96       Discharge Medications     Medication List      CHANGE how you take these medications    metoprolol tartrate 50 MG tablet  Commonly known as: LOPRESSOR  Take 0.5 tablets by mouth 2 times daily  Start taking on: July 27, 2021  What changed: how much to take        CONTINUE taking these medications    amLODIPine 10 MG tablet  Commonly known as: NORVASC     aspirin 81 MG tablet     atorvastatin 40 MG tablet  Commonly known as: LIPITOR     FLUoxetine 10 MG capsule  Commonly known as: PROZAC           Where to Get Your Medications      Information about where to get these medications is not yet available    Ask your nurse or doctor about these medications  · metoprolol tartrate 50 MG tablet           Discharged in stable condition to home     Follow Up:   Follow up with PCP in 1 week    Domitila Sanchez PA-C  7/26/2021 2:19 PM

## 2021-07-26 NOTE — PROGRESS NOTES
DC instructions reviewed with patient with a verbal understanding. IV removed with no complications. Patient dressed and stated he had all of his belongings. Patient refused wheel chair and was walked out with RN at side with a steady gait. Patient left facility in stable condition.

## 2021-07-26 NOTE — CONSULTS
[address] [facility]  [phone]        Reason for Consultation/Chief Complaint: \"I have been having nausea, dizziness after I ran out of Prozac and took old dose of prazosin. \"  Consulted for bradycardia    History of Present Illness:  Elisabeth Frias is a 79 y.o. patient [facility] Doyle Kayser [facility]. He has PMH HTN, HLD, PTSD, and mild NOCAD pt reports cath > 5 years ago showed\"minor blockage\" Most recent ECHO 7/25/19  EF 55%. mild concentric left ventricular hypertrophy. Grade I DD  Most recent Myoview gxt stress test 01/08/20 nearly uniform isotope uptake at stress and rest. There is no clear  evidence of myocardial ischemia or scar. Now presents with c/o dizziness and nausea after taking prazosin, metoprolol, and amlodipine all together. Normally does NOT take prazosin but he ran out of prozac and thought he could substitute prazosin. Developed symptoms shortly after taking 3 BP meds. Felt better after 45 minutes but wife concerned for low HR and presented to ER. Admit EKG revealed Sinus bradycardia 56bpm;  No CXR done; Troponin neg x 3. Noted HR's early during admit in 42's. Patient now feels better with no complaints of chest pain, SOB, palpitations, dizziness, edema, or orthopnea/PND. I have been asked to provide consultation regarding further management and testing. Past Medical History:   has a past medical history of Hyperlipidemia and Hypertension. Surgical History:   has a past surgical history that includes Tonsillectomy; Cystoscopy (Left, 08/17/2020); Cystoscopy (Left, 8/17/2020); Cystocopy (09/14/2020); CYSTOSCOPY INSERTION / REMOVAL STENT / STONE (Left, 9/14/2020); cervical fusion; and Urethral Surgery. Social History:   reports that he has quit smoking. He has never used smokeless tobacco. He reports that he does not drink alcohol. Family History:  family history includes Heart Disease in his mother;  Other in his father; Stroke in his mother. Home Medications:  Were reviewed and are listed in nursing record. and/or listed below  Prior to Admission medications    Medication Sig Start Date End Date Taking? Authorizing Provider   FLUoxetine (PROZAC) 10 MG capsule Take 10 mg by mouth daily   Yes Historical Provider, MD   metoprolol (LOPRESSOR) 50 MG tablet Take 50 mg by mouth 2 times daily. Yes Historical Provider, MD   atorvastatin (LIPITOR) 40 MG tablet Take 40 mg by mouth daily. Yes Historical Provider, MD   amLODIPine (NORVASC) 10 MG tablet Take 10 mg by mouth daily. Yes Historical Provider, MD   aspirin 81 MG tablet Take 81 mg by mouth daily.    Yes Historical Provider, MD        Allergies:  Gluten meal     Review of Systems:   12 point ROS negative in all areas as listed below except as in [place]  Constitutional, EENT, Cardiovascular, pulmonary, GI, , Musculoskeletal, skin, neurological, hematological, endocrine, Psychiatric    Physical Examination:    Vitals:    07/26/21 0256   BP: 111/67   Pulse: 68   Resp: 16   Temp: 97.5 °F (36.4 °C)   SpO2: 95%    Weight: 218 lb 4 oz (99 kg)         General Appearance:  Alert, cooperative, no distress, appears stated age   Head:  Normocephalic, without obvious abnormality, atraumatic   Eyes:  PERRL, conjunctiva/corneas clear       Nose: Nares normal, no drainage or sinus tenderness   Throat: Lips, mucosa, and tongue normal   Neck: Supple, symmetrical, trachea midline, no adenopathy, thyroid: not enlarged, symmetric, no tenderness/mass/nodules, no carotid bruit or JVD       Lungs:   Clear to auscultation bilaterally, respirations unlabored   Chest Wall:  No tenderness or deformity   Heart:  Regular rate and rhythm, S1, S2 normal, no murmur, rub or gallop   Abdomen:   Soft, non-tender, bowel sounds active all four quadrants,  no masses, no organomegaly           Extremities: Extremities normal, atraumatic, no cyanosis or edema   Pulses: 2+ and symmetric   Skin: Skin color, texture, turgor normal, no rashes or lesions   Pysch: Normal mood and affect   Neurologic: Normal gross motor and sensory exam.         Labs  CBC:   Lab Results   Component Value Date    WBC 9.3 2021    RBC 5.31 2021    HGB 15.0 2021    HCT 45.3 2021    MCV 85.4 2021    RDW 14.2 2021     2021     CMP:    Lab Results   Component Value Date     2021    K 4.2 2021     2021    CO2 25 2021    BUN 14 2021    CREATININE 0.6 2021    GFRAA >60 2021    AGRATIO 1.9 2021    LABGLOM >60 2021    GLUCOSE 136 2021    PROT 6.9 2021    CALCIUM 9.6 2021    BILITOT 0.5 2021    ALKPHOS 96 2021    AST 21 2021    ALT 33 2021     PT/INR:  No results found for: PTINR  Lab Results   Component Value Date    TROPONINI <0.01 2021       EK21 I have reviewed EKG with the following interpretation:  Impression:  See HPI Sinus bradycardiaOtherwise normal ECGConfirmed by Nikko Becerril MD, Magdaleno Rangel ([id number]) on 2021 7:51:15 PM    ECHO 19  Summary   Left ventricular systolic function is normal with ejection fraction   estimated at 55%. No regional wall motion abnormalities. There is mild concentric left ventricular hypertrophy. Grade I diastolic dysfunction with normal left ventricular filling pressure. The right atrium is mildly dilated. Systolic pulmonary artery pressure (SPAP) is normal estimated at 31 mmHg   (Right atrial pressure of 3 mmHg). No significant change from exam done 3/10/2015. Myoview gxt stress test 20  Summary Technically difficult study, heterogeneous tracer uptake  Normal LV function. There is nearly uniform isotope uptake at stress and rest. There is no clear  evidence of myocardial ischemia or scar. Assessment:  Rowdy Ryan is a 79 y.o. patient [facility] Dr Andrea Luu in 3/2015.   He has PMH HTN, HLD, PTSD, and mild NOCAD pt reports cath > 5 years ago showed\"minor blockage\" Most recent ECHO 7/25/19  EF 55%. mild concentric left ventricular hypertrophy. Grade I DD  Most recent Myoview gxt stress test 01/08/20 nearly uniform isotope uptake at stress and rest. There is no clear  evidence of myocardial ischemia or scar. Now presents with c/o dizziness and nausea after taking prazosin, metoprolol, and amlodipine all together. Normally does NOT take prazosin but he ran out of prozac and thought he could substitute prazosin. Developed symptoms shortly after taking 3 BP meds. Felt better after 45 minutes but wife concerned for low HR and presented to ER. Admit EKG revealed Sinus bradycardia 56bpm;  No CXR done; Troponin neg x 3. Noted HR's early during admit in 42's. Diagnosis of bradycardia and dizziness likely related to medication side effects. No evidence for high grade AV block and tele NSR. Patient clinically improved. Recs:  1. Continue baby aspirin and lipitor 40mg qd. 2. Restart metoprolol lower dose 25mg po BID on day AFTER d/c.  3. Do NOT take prazosin anymore. I counseled him against taking and he understands. 4. Continue other home meds on d/c  5. No need for cardiac testing at this time. OK for d/c from cardiac standpoint today if OK with IM team. Signing off. Patient Active Problem List   Diagnosis    Chest pain    HTN (hypertension)    HLD (hyperlipidemia)    Bradycardia       Thank you for allowing to us to participate in the care or Fabio Martin. Further evaluation will be based upon the patient's clinical course and testing results.

## 2021-07-26 NOTE — PROGRESS NOTES
Shift assessment complete, see flowhseets. IV flushed with no issues, dressing CDI. Pt reports no dizziness or pain. VSS. HR has been  55-60s bpm. 1,000ml of yellow/ clear urine, see output flowsheet. No needs or discomforts stated at this time. Call light in easy reach, bedside table in easy reach, and bed in lowest position.   Levern Baumgarten, RN

## 2021-07-26 NOTE — PROGRESS NOTES
H/p dictation id [id number]. [place] Date of service 07/25/2021. Symptomatic sinus bradycardia due to medication. Htn. Dyslipidemia.

## 2021-07-26 NOTE — PROGRESS NOTES
Progress Note    Admit Date:  7/25/2021    70M with HTN, PTSD and nightmares. He is on prozac for PTSD. He was previously taking prazosin for nightmares but this was stopped recently. He ran out of his prozac 2 days ago. Early yesterday morning (0200 on 7/25) he took prazosin as a sub for being out of prozac. In the AM of 7/25 he took his metoprolol and norvasc. Later in the morning he felt nauseated, dizzy, lightheaded, and diaphoretic. Came to ER and was found to be bradycardic. Admitted for observation    Subjective:  Mr. Tiffani Moya feels better today. HR overnight dropped to 39 once, now is stable in 50s-60s. Objective:     /67   Pulse 68   Temp 97.5 °F (36.4 °C) (Oral)   Resp 16   Ht 5' 6\" (1.676 m)   Wt 218 lb 4 oz (99 kg)   SpO2 95%   BMI 35.23 kg/m²          Intake/Output Summary (Last 24 hours) at 7/26/2021 0745  Last data filed at 7/26/2021 0739  Gross per 24 hour   Intake --   Output 2000 ml   Net -2000 ml       Physical Exam:    Gen: No distress. Alert. Eyes: PERRL. No sclera icterus. No conjunctival injection. ENT: No discharge. Pharynx clear. Neck: No JVD. Trachea midline. Resp: No accessory muscle use. No crackles. No wheezes. No rhonchi. CV: Regular rate. Regular rhythm. No murmur. No rub. No edema. GI: Non-tender. Non-distended. Normal bowel sounds. Skin: Warm and dry. No nodule on exposed extremities. No rash on exposed extremities. Superficial abrasions to distal RLE   M/S: No cyanosis. No joint deformity. No clubbing. Neuro: Awake. Grossly nonfocal    Psych: Oriented x 3. No anxiety or agitation.        Scheduled Meds:   aspirin  81 mg Oral Daily    atorvastatin  40 mg Oral Daily    FLUoxetine  10 mg Oral Daily    sodium chloride flush  5-40 mL Intravenous 2 times per day    enoxaparin  40 mg Subcutaneous Daily       Continuous Infusions:   sodium chloride         PRN Meds:  sodium chloride flush, sodium chloride, ondansetron **OR** ondansetron,

## 2021-07-26 NOTE — ACP (ADVANCE CARE PLANNING)
Advance Care Planning   Healthcare Decision Maker:    Primary Decision Maker: Antonette Meckel [facility] - [phone]

## 2022-12-07 ENCOUNTER — HOSPITAL ENCOUNTER (OUTPATIENT)
Age: 72
Discharge: HOME | End: 2022-12-07
Payer: MEDICARE

## 2022-12-07 LAB
EKG ATRIAL RATE: 65 BPM
EKG DIAGNOSIS: NORMAL
EKG P AXIS: 24 DEGREES
EKG P-R INTERVAL: 156 MS
EKG Q-T INTERVAL: 386 MS
EKG QRS DURATION: 98 MS
EKG QTC CALCULATION (BAZETT): 401 MS
EKG R AXIS: 35 DEGREES
EKG T AXIS: 41 DEGREES
EKG VENTRICULAR RATE: 65 BPM

## 2022-12-07 PROCEDURE — 93010 ELECTROCARDIOGRAM REPORT: CPT | Performed by: INTERNAL MEDICINE

## 2022-12-07 PROCEDURE — 93005 ELECTROCARDIOGRAM TRACING: CPT | Performed by: FAMILY MEDICINE

## 2023-02-16 ENCOUNTER — APPOINTMENT (OUTPATIENT)
Dept: GENERAL RADIOLOGY | Age: 73
End: 2023-02-16
Payer: MEDICARE

## 2023-02-16 ENCOUNTER — HOSPITAL ENCOUNTER (EMERGENCY)
Age: 73
Discharge: HOME | End: 2023-02-16
Attending: EMERGENCY MEDICINE
Payer: MEDICARE

## 2023-02-16 VITALS
HEIGHT: 66 IN | SYSTOLIC BLOOD PRESSURE: 168 MMHG | DIASTOLIC BLOOD PRESSURE: 72 MMHG | OXYGEN SATURATION: 96 % | BODY MASS INDEX: 36.96 KG/M2 | RESPIRATION RATE: 16 BRPM | TEMPERATURE: 98 F | HEART RATE: 81 BPM | WEIGHT: 230 LBS

## 2023-02-16 DIAGNOSIS — B35.1 FUNGAL TOENAIL INFECTION: ICD-10-CM

## 2023-02-16 DIAGNOSIS — S80.11XA LEG HEMATOMA, RIGHT, INITIAL ENCOUNTER: Primary | ICD-10-CM

## 2023-02-16 DIAGNOSIS — S80.811A ABRASION OF ANTERIOR RIGHT LOWER LEG, INITIAL ENCOUNTER: ICD-10-CM

## 2023-02-16 PROCEDURE — 99283 EMERGENCY DEPT VISIT LOW MDM: CPT

## 2023-02-16 PROCEDURE — 6370000000 HC RX 637 (ALT 250 FOR IP): Performed by: EMERGENCY MEDICINE

## 2023-02-16 PROCEDURE — 73590 X-RAY EXAM OF LOWER LEG: CPT

## 2023-02-16 RX ORDER — ACETAMINOPHEN 500 MG
1000 TABLET ORAL ONCE
Status: COMPLETED | OUTPATIENT
Start: 2023-02-16 | End: 2023-02-16

## 2023-02-16 RX ORDER — PRAZOSIN HYDROCHLORIDE 5 MG/1
5 CAPSULE ORAL NIGHTLY
COMMUNITY

## 2023-02-16 RX ADMIN — ACETAMINOPHEN 1000 MG: 500 TABLET ORAL at 21:00

## 2023-02-16 ASSESSMENT — PAIN - FUNCTIONAL ASSESSMENT
PAIN_FUNCTIONAL_ASSESSMENT: NONE - DENIES PAIN
PAIN_FUNCTIONAL_ASSESSMENT: NONE - DENIES PAIN

## 2023-02-16 ASSESSMENT — PAIN SCALES - GENERAL: PAINLEVEL_OUTOF10: 5

## 2023-02-17 NOTE — ED PROVIDER NOTES
Emergency Department Provider Note  Location: [facility] EMERGENCY DEPARTMENT  2/16/2023     Patient Identification  Jose Alejandro Wu is a 67 y.o. male    Chief Complaint  Leg Injury (RLE pain, \"slipped off the camper tongue\")      Mode of Arrival  private car    HPI  (History provided by patient)  This is a 67 y.o. male presented today for right lower leg pain. Patient states around 5 PM today, he stepped onto a step of his camper and lost his footing. He slid back down and his right anterior shin hit the edge of the step. He sustained a wound on the right anterior shin. Bleeding controlled prior to arrival.  He states initiator was in significant pain. However over the course of the last couple hours, the pain gradually increased and he also noticed swelling of the right lower extremity. No numbness or tingling distally. He is able to walk and bear weight. He denies injury anywhere else. He does not remember when was his last tetanus vaccination. No other complaints, modifying factors or associated symptoms. I have reviewed the following nursing documentation:  Allergies: Allergies   Allergen Reactions    Gluten Meal        Past medical history:  has a past medical history of Hyperlipidemia and Hypertension. Past surgical history:  has a past surgical history that includes Tonsillectomy; Cystoscopy (Left, 08/17/2020); Cystoscopy (Left, 8/17/2020); Cystocopy (09/14/2020); CYSTOSCOPY INSERTION / REMOVAL STENT / STONE (Left, 9/14/2020); cervical fusion; and Urethral Surgery. Home medications:   Prior to Admission medications    Medication Sig Start Date End Date Taking? Authorizing Provider   prazosin (MINIPRESS) 5 MG capsule Take 5 mg by mouth nightly   Yes Historical Provider, MD   metoprolol tartrate (LOPRESSOR) 50 MG tablet Take 0.5 tablets by mouth 2 times daily 7/27/21   Anel Coronel PA-C   atorvastatin (LIPITOR) 40 MG tablet Take 40 mg by mouth daily.     Historical Provider, MD amLODIPine (NORVASC) 10 MG tablet Take 10 mg by mouth daily. Historical Provider, MD   aspirin 81 MG tablet Take 81 mg by mouth daily. Historical Provider, MD       Social history:  reports that he has quit smoking. He has never used smokeless tobacco. He reports that he does not drink alcohol. Family history:    Family History   Problem Relation Age of Onset    Heart Disease Mother     Stroke Mother     Other Father        Exam  ED Triage Vitals [02/16/23 2033]   BP Temp Temp Source Heart Rate Resp SpO2 Height Weight   (!) 168/72 98 °F (36.7 °C) Oral 81 16 96 % 5' 6\" (1.676 m) 230 lb (104.3 kg)   Physical Exam  Vitals and nursing note reviewed. Constitutional:       General: He is not in acute distress. Appearance: Normal appearance. He is well-developed. He is not diaphoretic. HENT:      Head: Normocephalic and atraumatic. Eyes:      General: No scleral icterus. Neck:      Trachea: No tracheal deviation. Cardiovascular:      Pulses:           Dorsalis pedis pulses are 1+ on the right side. Posterior tibial pulses are 1+ on the right side. Pulmonary:      Effort: Pulmonary effort is normal. No respiratory distress. Musculoskeletal:      Right knee: No tenderness. Right lower leg: Tenderness (mid anterior shin where patient has a large bruise/hematoma with abrasion) present. Right ankle: Normal.      Comments: Fungal toenails noted. Skin:     General: Skin is warm and dry. Capillary Refill: Capillary refill takes less than 2 seconds. Findings: Abrasion (right anterior shin) and bruising (right anterior shin) present. Neurological:      Mental Status: He is alert and oriented to person, place, and time. Comments: Sensation motor intact in the right lower extremity   Psychiatric:         Mood and Affect: Mood and affect normal.         Behavior: Behavior is cooperative.            MDM/ED Course  Radiology  XR TIBIA FIBULA RIGHT (2 VIEWS)    Result Date: 2/16/2023  EXAMINATION: 2 XRAY VIEWS OF THE RIGHT TIBIA AND FIBULA 2/16/2023 8:47 pm COMPARISON: None. HISTORY: ORDERING SYSTEM PROVIDED HISTORY: fell, hit a metal step, bruise and swelling of the right tib/fib TECHNOLOGIST PROVIDED HISTORY: Reason for exam:->fell, hit a metal step, bruise and swelling of the right tib/fib Reason for Exam: fell FINDINGS: Negative for fracture or dislocation. Edema subcutaneous tissues in the medial proximal aspect of the calf. Of the knee anteriorly. No lytic destructive lesions. No radiopaque foreign objects. Small loose body in the joint space of the knee. No visualized fracture      - Patient seen and evaluated in room 4.  67 y.o. male presented for right lower extremity pain and abrasion after a fall. On exam, he has a large hematoma of the right lower leg with an abrasion in the anterior shin. Bleeding controlled prior to arrival.  He is neurovascularly intact on exam.  Compartments soft and no concern for compartment syndrome. Due to large hematoma, x-ray ordered to rule out acute fracture. - Pertinent old records reviewed; specifically vaccination record reviewed. Patient's last tetanus vaccination was in October 2018. Patient is up-to-date on tetanus. I discussed this specifically with the patient so he knows he does not need a vaccine update today. - Patient was given    ED Medication Orders (From admission, onward)      Start Ordered     Status Ordering Provider    02/16/23 2100 02/16/23 2043  acetaminophen (TYLENOL) tablet 1,000 mg  ONCE         Last MAR action: Given - by Axel Santana on 02/16/23 at [facility]          - Diagnostic studies reviewed. X-ray did not show acute bony injury. I discussed the x-ray result with the patient. We agreed to ice pack, Tylenol as needed for pain. Patient declined offer for crutches or walker. He said he is able to bear weight. Wound care instruction given.     - Incidentally found today is fungal toenail on exam.  We also discussed management tip and I will prescribe Penlac for the patient to use. - Wound care provided by nurse. Mepitel bandage applied to the abrasion.  - Return precautions also discussed. Patient verbalized understanding of care plan and agreed to follow-up with PCP as advised. - Is this patient to be included in the SEP-1 Core Measure due to severe sepsis or septic shock? No   Exclusion criteria - the patient is NOT to be included for SEP-1 Core Measure due to: Infection is not suspected    I estimate there is LOW risk for ACUTE FRACTURE OR DISLOCATION, COMPARTMENT SYNDROME, DEEP VENOUS THROMBOSIS, SEPTIC ARTHRITIS, TENDON OR NEUROVASCULAR INJURY, thus I consider the discharge disposition reasonable. Melanie Jim and I have discussed the diagnosis and risks, and we agree with discharging home to follow-up with PCP. We also discussed returning to the Emergency Department immediately if new or worsening symptoms occur. We have discussed the symptoms which are most concerning (e.g., changing or worsening pain, numbness, weakness) that necessitate immediate return. Clinical Impression:  1. Leg hematoma, right, initial encounter    2. Abrasion of anterior right lower leg, initial encounter    3. Fungal toenail infection        Disposition:  Discharge to home in good condition. Blood pressure (!) 168/72, pulse 81, temperature 98 °F (36.7 °C), temperature source Oral, resp. rate 16, height 5' 6\" (1.676 m), weight 230 lb (104.3 kg), SpO2 96 %. Patient was given scripts for the following medications. I counseled patient how to take these medications.    Discharge Medication List as of 2/16/2023  9:16 PM        START taking these medications    Details   ciclopirox (PENLAC) 8 % solution Apply to toenails topically nightly., Disp-6 mL, R-0, Print           IAlpesh am the primary attending of record and contributed the majority of evaluation and treatment of emergent care for this encounter. Total critical care time is 0 minutes, which excludes separately billable procedures and updating family. Time spent is specifically for management of the presenting complaint and symptoms initially, direct bedside care, reevaluation, review of records, and consultation. There was a high probability of clinically significant life-threatening deterioration in the patient's condition, which required my urgent intervention. This chart was generated in part by using Dragon Dictation system and may contain errors related to that system including errors in grammar, punctuation, and spelling, as well as words and phrases that may be inappropriate. If there are any questions or concerns please feel free to contact the dictating provider for clarification.      Josefina Wade MD  [id number] aJmes Laboy MD  02/16/23 3521

## 2025-02-05 ENCOUNTER — HOSPITAL ENCOUNTER (OUTPATIENT)
Age: 75
Discharge: HOME | End: 2025-02-05
Payer: MEDICARE

## 2025-02-05 PROCEDURE — 87329 GIARDIA AG IA: CPT

## 2025-02-05 PROCEDURE — 87328 CRYPTOSPORIDIUM AG IA: CPT

## 2025-02-06 LAB
CRYPTOSP AG STL QL IA: NORMAL
E HISTOLYT AG STL QL IA: NORMAL
G LAMBLIA AG STL QL IA: NORMAL

## 2025-02-19 ENCOUNTER — HOSPITAL ENCOUNTER (OUTPATIENT)
Age: 75
Discharge: HOME | End: 2025-02-19
Payer: MEDICARE

## 2025-02-19 ENCOUNTER — HOSPITAL ENCOUNTER (OUTPATIENT)
Dept: GENERAL RADIOLOGY | Age: 75
Discharge: HOME | End: 2025-02-19
Payer: MEDICARE

## 2025-02-19 DIAGNOSIS — R10.31 ABDOMINAL CRAMPING, BILATERAL LOWER QUADRANT: ICD-10-CM

## 2025-02-19 DIAGNOSIS — R10.32 ABDOMINAL CRAMPING, BILATERAL LOWER QUADRANT: ICD-10-CM

## 2025-02-19 LAB
ALBUMIN SERPL-MCNC: 4.1 G/DL (ref 3.4–5)
ALBUMIN/GLOB SERPL: 1.8 {RATIO} (ref 1.1–2.2)
ALP SERPL-CCNC: 86 U/L (ref 40–129)
ALT SERPL-CCNC: 30 U/L (ref 10–40)
ANION GAP SERPL CALCULATED.3IONS-SCNC: 7 MMOL/L (ref 3–16)
AST SERPL-CCNC: 26 U/L (ref 15–37)
BASOPHILS # BLD: 0.1 K/UL (ref 0–0.2)
BASOPHILS NFR BLD: 0.8 %
BILIRUB SERPL-MCNC: 0.4 MG/DL (ref 0–1)
BUN SERPL-MCNC: 16 MG/DL (ref 7–20)
CALCIUM SERPL-MCNC: 9.3 MG/DL (ref 8.3–10.6)
CHLORIDE SERPL-SCNC: 106 MMOL/L (ref 99–110)
CO2 SERPL-SCNC: 27 MMOL/L (ref 21–32)
CREAT SERPL-MCNC: 0.9 MG/DL (ref 0.8–1.3)
DEPRECATED RDW RBC AUTO: 14.5 % (ref 12.4–15.4)
EOSINOPHIL # BLD: 0.4 K/UL (ref 0–0.6)
EOSINOPHIL NFR BLD: 4.7 %
GFR SERPLBLD CREATININE-BSD FMLA CKD-EPI: 89 ML/MIN/{1.73_M2}
GLUCOSE SERPL-MCNC: 86 MG/DL (ref 70–99)
HCT VFR BLD AUTO: 42.5 % (ref 40.5–52.5)
HGB BLD-MCNC: 14.2 G/DL (ref 13.5–17.5)
IGA SERPL-MCNC: 114 MG/DL (ref 70–400)
INR PPP: 0.99 (ref 0.85–1.15)
LIPASE SERPL-CCNC: 31 U/L (ref 13–60)
LYMPHOCYTES # BLD: 2.1 K/UL (ref 1–5.1)
LYMPHOCYTES NFR BLD: 23.2 %
MCH RBC QN AUTO: 28.3 PG (ref 26–34)
MCHC RBC AUTO-ENTMCNC: 33.5 G/DL (ref 31–36)
MCV RBC AUTO: 84.5 FL (ref 80–100)
MONOCYTES # BLD: 0.7 K/UL (ref 0–1.3)
MONOCYTES NFR BLD: 8.3 %
NEUTROPHILS # BLD: 5.6 K/UL (ref 1.7–7.7)
NEUTROPHILS NFR BLD: 63 %
PLATELET # BLD AUTO: 205 K/UL (ref 135–450)
PMV BLD AUTO: 9.7 FL (ref 5–10.5)
POTASSIUM SERPL-SCNC: 4.2 MMOL/L (ref 3.5–5.1)
PROT SERPL-MCNC: 6.4 G/DL (ref 6.4–8.2)
PROTHROMBIN TIME: 13.3 SEC (ref 11.9–14.9)
RBC # BLD AUTO: 5.02 M/UL (ref 4.2–5.9)
SODIUM SERPL-SCNC: 140 MMOL/L (ref 136–145)
WBC # BLD AUTO: 9 K/UL (ref 4–11)

## 2025-02-19 PROCEDURE — 82784 ASSAY IGA/IGD/IGG/IGM EACH: CPT

## 2025-02-19 PROCEDURE — 85025 COMPLETE CBC W/AUTO DIFF WBC: CPT

## 2025-02-19 PROCEDURE — 74018 RADEX ABDOMEN 1 VIEW: CPT

## 2025-02-19 PROCEDURE — 83516 IMMUNOASSAY NONANTIBODY: CPT

## 2025-02-19 PROCEDURE — 36415 COLL VENOUS BLD VENIPUNCTURE: CPT

## 2025-02-19 PROCEDURE — 83690 ASSAY OF LIPASE: CPT

## 2025-02-19 PROCEDURE — 80053 COMPREHEN METABOLIC PANEL: CPT

## 2025-02-19 PROCEDURE — 85610 PROTHROMBIN TIME: CPT

## 2025-02-20 ENCOUNTER — HOSPITAL ENCOUNTER (OUTPATIENT)
Age: 75
Setting detail: SPECIMEN
Discharge: HOME | End: 2025-02-20
Payer: MEDICARE

## 2025-02-20 LAB
C DIFF TOX A+B STL QL IA: NORMAL
TISSUE TRANSGLUTAMINASE IGA: <0.5 U/ML (ref 0–14)

## 2025-02-20 PROCEDURE — 87324 CLOSTRIDIUM AG IA: CPT

## 2025-02-20 PROCEDURE — 82653 EL-1 FECAL QUANTITATIVE: CPT

## 2025-02-20 PROCEDURE — 87328 CRYPTOSPORIDIUM AG IA: CPT

## 2025-02-20 PROCEDURE — 87336 ENTAMOEB HIST DISPR AG IA: CPT

## 2025-02-20 PROCEDURE — 87449 NOS EACH ORGANISM AG IA: CPT

## 2025-02-20 PROCEDURE — 87506 IADNA-DNA/RNA PROBE TQ 6-11: CPT

## 2025-02-20 PROCEDURE — 82705 FATS/LIPIDS FECES QUAL: CPT

## 2025-02-20 PROCEDURE — 83993 ASSAY FOR CALPROTECTIN FECAL: CPT

## 2025-02-21 LAB
CRYPTOSP AG STL QL IA: NORMAL
E HISTOLYT AG STL QL IA: NORMAL
G LAMBLIA AG STL QL IA: NORMAL
GI PATHOGENS PNL STL NAA+PROBE: NORMAL

## 2025-02-22 LAB
FAT STL QL: NORMAL
NEUTRAL FAT STL QL: NORMAL

## 2025-02-25 ENCOUNTER — HOSPITAL ENCOUNTER (OUTPATIENT)
Dept: CT IMAGING | Age: 75
Discharge: HOME | End: 2025-02-25
Attending: INTERNAL MEDICINE
Payer: MEDICARE

## 2025-02-25 DIAGNOSIS — K56.609 INTESTINAL OBSTRUCTION, UNSPECIFIED CAUSE, UNSPECIFIED WHETHER PARTIAL OR COMPLETE (HCC): ICD-10-CM

## 2025-02-25 DIAGNOSIS — K56.7 ILEUS (HCC): ICD-10-CM

## 2025-02-25 LAB — CALPROTECTIN STL-MCNT: 106 UG/G

## 2025-02-25 PROCEDURE — 6360000004 HC RX CONTRAST MEDICATION: Performed by: INTERNAL MEDICINE

## 2025-02-25 PROCEDURE — 74177 CT ABD & PELVIS W/CONTRAST: CPT

## 2025-02-25 RX ORDER — DIATRIZOATE MEGLUMINE AND DIATRIZOATE SODIUM 660; 100 MG/ML; MG/ML
12 SOLUTION ORAL; RECTAL
Status: DISCONTINUED | OUTPATIENT
Start: 2025-02-25 | End: 2025-02-26 | Stop reason: HOSPADM

## 2025-02-25 RX ORDER — IOPAMIDOL 755 MG/ML
75 INJECTION, SOLUTION INTRAVASCULAR
Status: COMPLETED | OUTPATIENT
Start: 2025-02-25 | End: 2025-02-25

## 2025-02-25 RX ADMIN — DIATRIZOATE MEGLUMINE AND DIATRIZOATE SODIUM 12 ML: 660; 100 LIQUID ORAL; RECTAL at 13:10

## 2025-02-25 RX ADMIN — IOPAMIDOL 75 ML: 755 INJECTION, SOLUTION INTRAVENOUS at 13:10

## 2025-02-27 LAB — ELASTASE PANC STL-MCNT: 460 UG/G

## 2025-07-24 ENCOUNTER — OFFICE VISIT (OUTPATIENT)
Dept: ENT CLINIC | Age: 75
End: 2025-07-24
Payer: MEDICARE

## 2025-07-24 VITALS
HEIGHT: 66 IN | DIASTOLIC BLOOD PRESSURE: 88 MMHG | BODY MASS INDEX: 33.59 KG/M2 | WEIGHT: 209 LBS | SYSTOLIC BLOOD PRESSURE: 155 MMHG | HEART RATE: 72 BPM

## 2025-07-24 DIAGNOSIS — R09.A2 GLOBUS SENSATION: Primary | ICD-10-CM

## 2025-07-24 PROCEDURE — 3077F SYST BP >= 140 MM HG: CPT | Performed by: OTOLARYNGOLOGY

## 2025-07-24 PROCEDURE — 1036F TOBACCO NON-USER: CPT | Performed by: OTOLARYNGOLOGY

## 2025-07-24 PROCEDURE — 3017F COLORECTAL CA SCREEN DOC REV: CPT | Performed by: OTOLARYNGOLOGY

## 2025-07-24 PROCEDURE — G8427 DOCREV CUR MEDS BY ELIG CLIN: HCPCS | Performed by: OTOLARYNGOLOGY

## 2025-07-24 PROCEDURE — 1159F MED LIST DOCD IN RCRD: CPT | Performed by: OTOLARYNGOLOGY

## 2025-07-24 PROCEDURE — 3079F DIAST BP 80-89 MM HG: CPT | Performed by: OTOLARYNGOLOGY

## 2025-07-24 PROCEDURE — 31575 DIAGNOSTIC LARYNGOSCOPY: CPT | Performed by: OTOLARYNGOLOGY

## 2025-07-24 PROCEDURE — 1124F ACP DISCUSS-NO DSCNMKR DOCD: CPT | Performed by: OTOLARYNGOLOGY

## 2025-07-24 PROCEDURE — G8417 CALC BMI ABV UP PARAM F/U: HCPCS | Performed by: OTOLARYNGOLOGY

## 2025-07-24 PROCEDURE — 99203 OFFICE O/P NEW LOW 30 MIN: CPT | Performed by: OTOLARYNGOLOGY

## 2025-07-24 PROCEDURE — 1160F RVW MEDS BY RX/DR IN RCRD: CPT | Performed by: OTOLARYNGOLOGY

## 2025-07-24 ASSESSMENT — ENCOUNTER SYMPTOMS
SINUS PRESSURE: 0
SORE THROAT: 0
FACIAL SWELLING: 0
COUGH: 0
VOICE CHANGE: 0
APNEA: 0
EYE ITCHING: 0
SHORTNESS OF BREATH: 0
TROUBLE SWALLOWING: 0

## 2025-07-24 NOTE — PROGRESS NOTES
edema or rhinorrhea.      Mouth/Throat:      Lips: Pink.      Mouth: Mucous membranes are moist.      Tongue: No lesions.      Palate: No mass.      Pharynx: Uvula midline.   Eyes:      Pupils: Pupils are equal, round, and reactive to light.   Neck:      Thyroid: No thyroid mass or thyromegaly.      Trachea: Trachea and phonation normal.   Cardiovascular:      Pulses: Normal pulses.   Pulmonary:      Effort: Pulmonary effort is normal. No accessory muscle usage or respiratory distress.      Breath sounds: No stridor.   Musculoskeletal:      Cervical back: Full passive range of motion without pain.   Lymphadenopathy:      Head:      Right side of head: No submental or submandibular adenopathy.      Left side of head: No submental or submandibular adenopathy.      Cervical: No cervical adenopathy.      Right cervical: No superficial, deep or posterior cervical adenopathy.     Left cervical: No superficial, deep or posterior cervical adenopathy.   Skin:     General: Skin is warm and dry.   Neurological:      Mental Status: He is alert and oriented to person, place, and time.      Cranial Nerves: No cranial nerve deficit.      Coordination: Coordination normal.      Gait: Gait normal.   Psychiatric:         Thought Content: Thought content normal.           Procedure     Flexible Laryngoscopy    Pre op: globus   Post op: same  Procedure : Flexible Laryngoscopy  Surgeon: Viridiana Daniel DO  Anesthesia: Afrin with 4% lidocaine  Indication: 74 year old male Laryngeal mirror examination was not tolerated due to gag reflex  Description:  The scope was passed along the floor of the left naris to the level of the larynx. The base of tongue, vallecula, epiglottis, aryepiglottic folds, arytenoids, false vocal folds, true vocal folds, or pyriform sinuses were all evaluated. True vocal folds exhibited symmetric motion bilaterally without evidence of paralysis or paresis.The scope was removed. The patient tolerated the procedure

## (undated) DEVICE — BAG URIN STRL FOR URO CTCH SYS

## (undated) DEVICE — Z CONVERTED USE 2271043 CONTAINER SPEC COLL 4OZ SCR ON LID PEEL PCH

## (undated) DEVICE — GLOVE ORANGE PI 7 1/2   MSG9075

## (undated) DEVICE — DBD-PACK,CYSTOSCOPY,PK VI,AURORA: Brand: MEDLINE

## (undated) DEVICE — MEDI-VAC NON-CONDUCTIVE SUCTION TUBING: Brand: CARDINAL HEALTH

## (undated) DEVICE — PREP SOL PVP IODINE 4%  4 OZ/BTL

## (undated) DEVICE — INVIEW CLEAR LEGGINGS: Brand: CONVERTORS

## (undated) DEVICE — CANNULA NSL 13FT TUBE AD ETCO2 DIV SAMP M

## (undated) DEVICE — GAUZE,SPONGE,4"X4",8PLY,STRL,LF,10/TRAY: Brand: MEDLINE

## (undated) DEVICE — Z DUP USE 2522782 SOLUTION IRRIG 1000ML STRL H2O PLAS CONTAINER UROMATIC

## (undated) DEVICE — BOWL MED L 32OZ PLAS W/ MOLD GRAD EZ OPN PEEL PCH

## (undated) DEVICE — CIRCUIT ANES L72IN 3L BACT AND VIR FLTR EL CONN SGL LIMB

## (undated) DEVICE — Z DUP USE 2139333 GUIDEWIRE UROLOGICAL STR STD 0.035 IN X150 CM REG ZIPWIRE LF

## (undated) DEVICE — CYSTO/BLADDER IRRIGATION SET, REGULATING CLAMP

## (undated) DEVICE — SYRINGE MED 10ML LUERLOCK TIP W/O SFTY DISP

## (undated) DEVICE — GOWN SIRUS NONREIN LG W/TWL: Brand: MEDLINE INDUSTRIES, INC.

## (undated) DEVICE — SOLUTION IV IRRIG 500ML 0.9% SODIUM CHL 2F7123

## (undated) DEVICE — STONE RETRIEVAL BASKET: Brand: SEGURA HEMISPHERE